# Patient Record
Sex: FEMALE | Race: WHITE | NOT HISPANIC OR LATINO | Employment: FULL TIME | URBAN - METROPOLITAN AREA
[De-identification: names, ages, dates, MRNs, and addresses within clinical notes are randomized per-mention and may not be internally consistent; named-entity substitution may affect disease eponyms.]

---

## 2017-01-04 ENCOUNTER — TELEPHONE (OUTPATIENT)
Dept: OBSTETRICS AND GYNECOLOGY | Age: 27
End: 2017-01-04

## 2017-01-04 NOTE — TELEPHONE ENCOUNTER
----- Message from Kindra Miles sent at 1/4/2017 11:48 AM EST -----  Dr Sheriff pt, requesting a 2nd opinion at [facility], but is needing a referral from Dr Sheriff first. [phone] pls call pt at [phone]

## 2017-01-04 NOTE — TELEPHONE ENCOUNTER
Called pt but number would not go through from office as given, reviewed allscripts records, unsure of reason for referral, last paps were normal, advise pt to schedule visit to discuss further

## 2017-01-10 ENCOUNTER — TELEPHONE (OUTPATIENT)
Dept: OBSTETRICS AND GYNECOLOGY | Age: 27
End: 2017-01-10

## 2017-01-10 NOTE — TELEPHONE ENCOUNTER
----- Message from Khushbu Vasquez sent at 1/10/2017  2:44 PM EST -----  ----- Message from Kindra Miles sent at 1/4/2017 11:48 AM EST -----  Dr Sheriff pt, requesting a 2nd opinion at [facility], but is needing a referral from Dr Sheriff first. [phone]       Pt is wanting a 2nd opinion about an issue she has had for 5 years. Please call pt- [phone] corrected number.

## 2017-01-10 NOTE — TELEPHONE ENCOUNTER
Called pt, she called and requested she be seen by oncologist due to abnormal paps. Patient has had 2 negative pap smears and is overdue for annual/repeat pap smear. Reviewed allscripts records, per records, np reviewed last pap and left message for staff to notify pt that pap was normal. Patient was advised of prior pap and colpo results that were negative by MD. Advised pt of prior normal results so no reason to refer to oncology at this time. She is overdue for annual so if current results are abnormal or if there is another issue, can refer  as indicated. Explained to pt that it would not be appropriate for me to make a referral without a current indication.    Patient reports that she was told her last pap was abnormal, but explained that the pap was normal and the reviewer advised that pt be given negative results. Again reviewed results of most recent pathology with pt. She reports that she was told it was abnormal and wants a second opinion. Recommended she schedule annual visit but she can seek second opinion as she desires.

## 2017-01-10 NOTE — TELEPHONE ENCOUNTER
Offered pt an appt 1-30-17 at 2:00, she does not get off work til 2, offered next avail 2-13-17 pt did not want to wait that long and stated she will go to another dr robles.

## 2017-01-10 NOTE — TELEPHONE ENCOUNTER
Please have pt schedule visit, unsure of specific issue, she was previously treated by other MD, will need to review before I can schedule consult

## 2017-12-08 ENCOUNTER — OFFICE VISIT (OUTPATIENT)
Dept: RETAIL CLINIC | Facility: CLINIC | Age: 27
End: 2017-12-08

## 2017-12-08 VITALS
RESPIRATION RATE: 16 BRPM | SYSTOLIC BLOOD PRESSURE: 112 MMHG | HEART RATE: 78 BPM | DIASTOLIC BLOOD PRESSURE: 80 MMHG | OXYGEN SATURATION: 99 % | TEMPERATURE: 97.2 F

## 2017-12-08 DIAGNOSIS — H65.03 BILATERAL ACUTE SEROUS OTITIS MEDIA, RECURRENCE NOT SPECIFIED: Primary | ICD-10-CM

## 2017-12-08 DIAGNOSIS — J06.9 VIRAL UPPER RESPIRATORY INFECTION: ICD-10-CM

## 2017-12-08 DIAGNOSIS — A08.4 VIRAL GASTROENTERITIS: ICD-10-CM

## 2017-12-08 PROBLEM — R11.0 NAUSEA: Status: ACTIVE | Noted: 2017-12-08

## 2017-12-08 PROBLEM — H93.8X3 EAR FULLNESS, BILATERAL: Status: ACTIVE | Noted: 2017-12-08

## 2017-12-08 PROBLEM — R19.7 DIARRHEA: Status: ACTIVE | Noted: 2017-12-08

## 2017-12-08 PROCEDURE — 99213 OFFICE O/P EST LOW 20 MIN: CPT | Performed by: NURSE PRACTITIONER

## 2017-12-08 RX ORDER — DOXEPIN HYDROCHLORIDE 50 MG/1
50 CAPSULE ORAL NIGHTLY
COMMUNITY
End: 2018-08-09

## 2017-12-08 RX ORDER — ONDANSETRON 4 MG/1
4 TABLET, FILM COATED ORAL EVERY 8 HOURS PRN
Qty: 9 TABLET | Refills: 0 | Status: SHIPPED | OUTPATIENT
Start: 2017-12-08 | End: 2018-08-09

## 2017-12-08 RX ORDER — TOPIRAMATE 100 MG/1
CAPSULE, EXTENDED RELEASE ORAL
COMMUNITY
End: 2018-08-09

## 2017-12-08 RX ORDER — PREDNISONE 1 MG/1
TABLET ORAL
Qty: 21 TABLET | Refills: 0 | Status: SHIPPED | OUTPATIENT
Start: 2017-12-08 | End: 2018-08-09

## 2017-12-08 NOTE — PROGRESS NOTES
Subjective   Carolina Frias is a 27 y.o. female.     URI    This is a new problem. The current episode started yesterday. There has been no fever. Associated symptoms include congestion, coughing, headaches, nausea and a plugged ear sensation. She has tried acetaminophen for the symptoms. The treatment provided mild relief.   Nausea   This is a new problem. The current episode started yesterday. The problem has been waxing and waning. Associated symptoms include chills, congestion, coughing, headaches and nausea. Nothing aggravates the symptoms. She has tried acetaminophen for the symptoms. The treatment provided mild relief.   Ear Fullness    There is pain in both ears. This is a recurrent problem. The current episode started in the past 7 days. There has been no fever. Associated symptoms include coughing and headaches. Pertinent negatives include no hearing loss. She has tried acetaminophen for the symptoms. The treatment provided mild relief. There is no history of a chronic ear infection.        The following portions of the patient's history were reviewed and updated as appropriate: allergies, current medications, past family history, past medical history, past social history, past surgical history and problem list.    Review of Systems   Constitutional: Positive for chills.   HENT: Positive for congestion and voice change. Negative for hearing loss and trouble swallowing.         Both ear fullness   Eyes: Negative.    Respiratory: Positive for cough. Negative for chest tightness.    Cardiovascular: Negative.    Gastrointestinal: Positive for nausea.   Neurological: Positive for headaches.       Objective   Physical Exam   Constitutional: She appears well-developed.   HENT:   Head: Normocephalic and atraumatic.   Right Ear: External ear normal. Tympanic membrane mobility is abnormal. A middle ear effusion is present.   Left Ear: External ear normal. Tympanic membrane mobility is abnormal. A middle ear  effusion is present.   Nose: Mucosal edema present. Right sinus exhibits no maxillary sinus tenderness and no frontal sinus tenderness. Left sinus exhibits no maxillary sinus tenderness and no frontal sinus tenderness.   Mouth/Throat: No oropharyngeal exudate.   Eyes: Pupils are equal, round, and reactive to light.   Neck: Normal range of motion.   Cardiovascular: Normal rate and regular rhythm.    Pulmonary/Chest: Effort normal and breath sounds normal. She has no decreased breath sounds. She has no wheezes. She has no rhonchi. She has no rales.   Abdominal: Soft. Bowel sounds are normal. There is generalized tenderness and tenderness in the epigastric area.   Nursing note and vitals reviewed.      Assessment/Plan   Carolina was seen today for uri, nausea, diarrhea, cough and nasal congestion.    Diagnoses and all orders for this visit:    Bilateral acute serous otitis media, recurrence not specified  -     predniSONE (DELTASONE) 5 MG tablet; 5 mg pack with packet instructions    Viral gastroenteritis  -     ondansetron (ZOFRAN) 4 MG tablet; Take 1 tablet by mouth Every 8 (Eight) Hours As Needed for Nausea or Vomiting.    Viral upper respiratory infection  -     Chlorcyclizine-Pseudoephed (STAHIST AD) 25-60 MG tablet; Take 1 tablet by mouth 2 (Two) Times a Day for 7 days.    Talked to the patient about the diagnosis and educate the patient and advise to visit to PCP if the symptoms worsens

## 2017-12-08 NOTE — PATIENT INSTRUCTIONS
Serous Otitis Media  Serous otitis media is fluid in the middle ear space. This space contains the bones for hearing and air. Air in the middle ear space helps to transmit sound.   The air gets there through the eustachian tube. This tube goes from the back of the nose (nasopharynx) to the middle ear space. It keeps the pressure in the middle ear the same as the outside world. It also helps to drain fluid from the middle ear space.  CAUSES   Serous otitis media occurs when the eustachian tube gets blocked. Blockage can come from:  · Ear infections.  · Colds and other upper respiratory infections.  · Allergies.  · Irritants such as cigarette smoke.  · Sudden changes in air pressure (such as descending in an airplane).  · Enlarged adenoids.  · A mass in the nasopharynx.  During colds and upper respiratory infections, the middle ear space can become temporarily filled with fluid. This can happen after an ear infection also. Once the infection clears, the fluid will generally drain out of the ear through the eustachian tube. If it does not, then serous otitis media occurs.  SIGNS AND SYMPTOMS   · Hearing loss.  · A feeling of fullness in the ear, without pain.  · Young children may not show any symptoms but may show slight behavioral changes, such as agitation, ear pulling, or crying.  DIAGNOSIS   Serous otitis media is diagnosed by an ear exam. Tests may be done to check on the movement of the eardrum. Hearing exams may also be done.  TREATMENT   The fluid most often goes away without treatment. If allergy is the cause, allergy treatment may be helpful. Fluid that persists for several months may require minor surgery. A small tube is placed in the eardrum to:  · Drain the fluid.  · Restore the air in the middle ear space.  In certain situations, antibiotic medicines are used to avoid surgery. Surgery may be done to remove enlarged adenoids (if this is the cause).  HOME CARE INSTRUCTIONS   · Keep children away from  tobacco smoke.  · Keep all follow-up visits as directed by your health care provider.  SEEK MEDICAL CARE IF:   · Your hearing is not better in 3 months.  · Your hearing is worse.  · You have ear pain.  · You have drainage from the ear.  · You have dizziness.  · You have serous otitis media only in one ear or have any bleeding from your nose (epistaxis).  · You notice a lump on your neck.  MAKE SURE YOU:  · Understand these instructions.    · Will watch your condition.    · Will get help right away if you are not doing well or get worse.       This information is not intended to replace advice given to you by your health care provider. Make sure you discuss any questions you have with your health care provider.     Document Released: 03/09/2005 Document Revised: 01/08/2016 Document Reviewed: 07/15/2014  [employer] Interactive Patient Education ©2017 [employer]    Viral Gastroenteritis, Adult  Viral gastroenteritis is also known as the stomach flu. This condition is caused by various viruses. These viruses can be passed from person to person very easily (are very contagious). This condition may affect your stomach, small intestine, and large intestine. It can cause sudden watery diarrhea, fever, and vomiting.  Diarrhea and vomiting can make you feel weak and cause you to become dehydrated. You may not be able to keep fluids down. Dehydration can make you tired and thirsty, cause you to have a dry mouth, and decrease how often you urinate. Older adults and people with other diseases or a weak immune system are at higher risk for dehydration.  It is important to replace the fluids that you lose from diarrhea and vomiting. If you become severely dehydrated, you may need to get fluids through an IV tube.  CAUSES  Gastroenteritis is caused by various viruses, including rotavirus and norovirus. Norovirus is the most common cause in adults.  You can get sick by eating food, drinking water, or touching a surface contaminated  with one of these viruses. You can also get sick from sharing utensils or other personal items with an infected person.  RISK FACTORS  This condition is more likely to develop in people:  · Who have a weak defense system (immune system).  · Who live with one or more children who are younger than 2 years old.  · Who live in a nursing home.  · Who go on cruise ships.  SYMPTOMS  Symptoms of this condition start suddenly 1-2 days after exposure to a virus. Symptoms may last a few days or as long as a week. The most common symptoms are watery diarrhea and vomiting. Other symptoms include:  · Fever.  · Headache.  · Fatigue.  · Pain in the abdomen.  · Chills.  · Weakness.  · Nausea.  · Muscle aches.  · Loss of appetite.  DIAGNOSIS  This condition is diagnosed with a medical history and physical exam. You may also have a stool test to check for viruses or other infections.  TREATMENT  This condition typically goes away on its own. The focus of treatment is to restore lost fluids (rehydration). Your health care provider may recommend that you take an oral rehydration solution (ORS) to replace important salts and minerals (electrolytes) in your body. Severe cases of this condition may require giving fluids through an IV tube.  Treatment may also include medicine to help with your symptoms.  HOME CARE INSTRUCTIONS  Follow instructions from your health care provider about how to care for yourself at home.  Eating and Drinking  Follow these recommendations as told by your health care provider:  · Take an ORS. This is a drink that is sold at pharmacies and retail stores.  · Drink clear fluids in small amounts as you are able. Clear fluids include water, ice chips, diluted fruit juice, and low-calorie sports drinks.  · Eat bland, easy-to-digest foods in small amounts as you are able. These foods include bananas, applesauce, rice, lean meats, toast, and crackers.  · Avoid fluids that contain a lot of sugar or caffeine, such as  energy drinks, sports drinks, and soda.  · Avoid alcohol.  · Avoid spicy or fatty foods.  General Instructions  · Drink enough fluid to keep your urine clear or pale yellow.  · Wash your hands often. If soap and water are not available, use hand .  · Make sure that all people in your household wash their hands well and often.  · Take over-the-counter and prescription medicines only as told by your health care provider.  · Rest at home while you recover.  · Watch your condition for any changes.  · Take a warm bath to relieve any burning or pain from frequent diarrhea episodes.  · Keep all follow-up visits as told by your health care provider. This is important.  SEEK MEDICAL CARE IF:  · You cannot keep fluids down.  · Your symptoms get worse.  · You have new symptoms.  · You feel light-headed or dizzy.  · You have muscle cramps.  SEEK IMMEDIATE MEDICAL CARE IF:  · You have chest pain.  · You feel extremely weak or you faint.  · You see blood in your vomit.  · Your vomit looks like coffee grounds.  · You have bloody or black stools or stools that look like tar.  · You have a severe headache, a stiff neck, or both.  · You have a rash.  · You have severe pain, cramping, or bloating in your abdomen.  · You have trouble breathing or you are breathing very quickly.  · Your heart is beating very quickly.  · Your skin feels cold and clammy.  · You feel confused.  · You have pain when you urinate.  · You have signs of dehydration, such as:    Dark urine, very little urine, or no urine.    Cracked lips.    Dry mouth.    Sunken eyes.    Sleepiness.    Weakness.     This information is not intended to replace advice given to you by your health care provider. Make sure you discuss any questions you have with your health care provider.     Document Released: 12/18/2006 Document Revised: 04/10/2017 Document Reviewed: 08/23/2016  [employer] Interactive Patient Education ©2017 [employer]    Upper Respiratory Infection,  "Adult  Most upper respiratory infections (URIs) are a viral infection of the air passages leading to the lungs. A URI affects the nose, throat, and upper air passages. The most common type of URI is nasopharyngitis and is typically referred to as \"the common cold.\"  URIs run their course and usually go away on their own. Most of the time, a URI does not require medical attention, but sometimes a bacterial infection in the upper airways can follow a viral infection. This is called a secondary infection. Sinus and middle ear infections are common types of secondary upper respiratory infections.  Bacterial pneumonia can also complicate a URI. A URI can worsen asthma and chronic obstructive pulmonary disease (COPD). Sometimes, these complications can require emergency medical care and may be life threatening.   CAUSES  Almost all URIs are caused by viruses. A virus is a type of germ and can spread from one person to another.   RISKS FACTORS  You may be at risk for a URI if:   · You smoke.    · You have chronic heart or lung disease.  · You have a weakened defense (immune) system.    · You are very young or very old.    · You have nasal allergies or asthma.  · You work in crowded or poorly ventilated areas.  · You work in health care facilities or schools.  SIGNS AND SYMPTOMS   Symptoms typically develop 2-3 days after you come in contact with a cold virus. Most viral URIs last 7-10 days. However, viral URIs from the influenza virus (flu virus) can last 14-18 days and are typically more severe. Symptoms may include:   · Runny or stuffy (congested) nose.    · Sneezing.    · Cough.    · Sore throat.    · Headache.    · Fatigue.    · Fever.    · Loss of appetite.    · Pain in your forehead, behind your eyes, and over your cheekbones (sinus pain).  · Muscle aches.    DIAGNOSIS   Your health care provider may diagnose a URI by:  · Physical exam.  · Tests to check that your symptoms are not due to another condition such " as:  ¨ Strep throat.  ¨ Sinusitis.  ¨ Pneumonia.  ¨ Asthma.  TREATMENT   A URI goes away on its own with time. It cannot be cured with medicines, but medicines may be prescribed or recommended to relieve symptoms. Medicines may help:  · Reduce your fever.  · Reduce your cough.  · Relieve nasal congestion.  HOME CARE INSTRUCTIONS   · Take medicines only as directed by your health care provider.    · Gargle warm saltwater or take cough drops to comfort your throat as directed by your health care provider.  · Use a warm mist humidifier or inhale steam from a shower to increase air moisture. This may make it easier to breathe.  · Drink enough fluid to keep your urine clear or pale yellow.    · Eat soups and other clear broths and maintain good nutrition.    · Rest as needed.    · Return to work when your temperature has returned to normal or as your health care provider advises. You may need to stay home longer to avoid infecting others. You can also use a face mask and careful hand washing to prevent spread of the virus.  · Increase the usage of your inhaler if you have asthma.    · Do not use any tobacco products, including cigarettes, chewing tobacco, or electronic cigarettes. If you need help quitting, ask your health care provider.  PREVENTION   The best way to protect yourself from getting a cold is to practice good hygiene.   · Avoid oral or hand contact with people with cold symptoms.    · Wash your hands often if contact occurs.    There is no clear evidence that vitamin C, vitamin E, echinacea, or exercise reduces the chance of developing a cold. However, it is always recommended to get plenty of rest, exercise, and practice good nutrition.   SEEK MEDICAL CARE IF:   · You are getting worse rather than better.    · Your symptoms are not controlled by medicine.    · You have chills.  · You have worsening shortness of breath.  · You have brown or red mucus.  · You have yellow or brown nasal discharge.  · You have  pain in your face, especially when you bend forward.  · You have a fever.  · You have swollen neck glands.  · You have pain while swallowing.  · You have white areas in the back of your throat.  SEEK IMMEDIATE MEDICAL CARE IF:   · You have severe or persistent:    Headache.    Ear pain.    Sinus pain.    Chest pain.  · You have chronic lung disease and any of the following:    Wheezing.    Prolonged cough.    Coughing up blood.    A change in your usual mucus.  · You have a stiff neck.  · You have changes in your:    Vision.    Hearing.    Thinking.    Mood.  MAKE SURE YOU:   · Understand these instructions.  · Will watch your condition.  · Will get help right away if you are not doing well or get worse.     This information is not intended to replace advice given to you by your health care provider. Make sure you discuss any questions you have with your health care provider.     Document Released: 06/13/2002 Document Revised: 05/03/2016 Document Reviewed: 03/25/2015  [employer] Interactive Patient Education ©2017 [employer]  Talked to the patient about the diagnosis and educate the patient and advise to visit to PCP if the symptoms worsens

## 2018-02-02 ENCOUNTER — TRANSCRIBE ORDERS (OUTPATIENT)
Dept: ADMINISTRATIVE | Facility: HOSPITAL | Age: 28
End: 2018-02-02

## 2018-02-02 ENCOUNTER — HOSPITAL ENCOUNTER (OUTPATIENT)
Dept: CARDIOLOGY | Facility: HOSPITAL | Age: 28
Discharge: HOME | End: 2018-02-02
Admitting: FAMILY MEDICINE

## 2018-02-02 DIAGNOSIS — R00.2 PALPITATIONS: ICD-10-CM

## 2018-02-02 DIAGNOSIS — R00.2 PALPITATIONS: Primary | ICD-10-CM

## 2018-02-02 PROCEDURE — 93225 XTRNL ECG REC<48 HRS REC: CPT

## 2018-02-02 PROCEDURE — 93226 XTRNL ECG REC<48 HR SCAN A/R: CPT

## 2018-02-05 PROCEDURE — 93227 XTRNL ECG REC<48 HR R&I: CPT | Performed by: INTERNAL MEDICINE

## 2018-08-02 NOTE — TELEPHONE ENCOUNTER
Attempted to call, number will not go through as given   Needs significant assistance at home; will need to look into further resources (see below)

## 2018-08-09 ENCOUNTER — HOSPITAL ENCOUNTER (EMERGENCY)
Facility: HOSPITAL | Age: 28
Discharge: HOME | End: 2018-08-09
Attending: EMERGENCY MEDICINE | Admitting: EMERGENCY MEDICINE

## 2018-08-09 ENCOUNTER — APPOINTMENT (OUTPATIENT)
Dept: CT IMAGING | Facility: HOSPITAL | Age: 28
End: 2018-08-09

## 2018-08-09 VITALS
BODY MASS INDEX: 45.82 KG/M2 | SYSTOLIC BLOOD PRESSURE: 124 MMHG | HEIGHT: 62 IN | RESPIRATION RATE: 18 BRPM | HEART RATE: 80 BPM | DIASTOLIC BLOOD PRESSURE: 79 MMHG | TEMPERATURE: 98.4 F | OXYGEN SATURATION: 97 % | WEIGHT: 249 LBS

## 2018-08-09 DIAGNOSIS — R51.9 ACUTE NONINTRACTABLE HEADACHE, UNSPECIFIED HEADACHE TYPE: Primary | ICD-10-CM

## 2018-08-09 LAB
ALBUMIN SERPL-MCNC: 4.2 G/DL (ref 3.5–5.2)
ALBUMIN/GLOB SERPL: 1.5 G/DL
ALP SERPL-CCNC: 67 U/L (ref 40–129)
ALT SERPL W P-5'-P-CCNC: 12 U/L (ref 5–33)
ANION GAP SERPL CALCULATED.3IONS-SCNC: 14 MMOL/L
AST SERPL-CCNC: 14 U/L (ref 5–32)
BASOPHILS # BLD AUTO: 0.03 10*3/MM3 (ref 0–0.2)
BASOPHILS NFR BLD AUTO: 0.5 % (ref 0–2)
BILIRUB SERPL-MCNC: 0.3 MG/DL (ref 0.2–1.2)
BUN BLD-MCNC: 12 MG/DL (ref 6–20)
BUN/CREAT SERPL: 21.8 (ref 7–25)
CALCIUM SPEC-SCNC: 8.8 MG/DL (ref 8.6–10.5)
CHLORIDE SERPL-SCNC: 101 MMOL/L (ref 98–107)
CO2 SERPL-SCNC: 24 MMOL/L (ref 22–29)
CREAT BLD-MCNC: 0.55 MG/DL (ref 0.57–1)
DEPRECATED RDW RBC AUTO: 35.8 FL (ref 37–54)
EOSINOPHIL # BLD AUTO: 0.06 10*3/MM3 (ref 0.1–0.3)
EOSINOPHIL NFR BLD AUTO: 1 % (ref 0–4)
ERYTHROCYTE [DISTWIDTH] IN BLOOD BY AUTOMATED COUNT: 11.8 % (ref 11.5–14.5)
GFR SERPL CREATININE-BSD FRML MDRD: 133 ML/MIN/1.73
GLOBULIN UR ELPH-MCNC: 2.8 GM/DL
GLUCOSE BLD-MCNC: 103 MG/DL (ref 65–99)
HCG SERPL QL: NEGATIVE
HCT VFR BLD AUTO: 40.7 % (ref 37–47)
HGB BLD-MCNC: 13.5 G/DL (ref 12–16)
IMM GRANULOCYTES # BLD: 0.01 10*3/MM3 (ref 0–0.03)
IMM GRANULOCYTES NFR BLD: 0.2 % (ref 0–0.5)
LYMPHOCYTES # BLD AUTO: 1.81 10*3/MM3 (ref 0.6–4.8)
LYMPHOCYTES NFR BLD AUTO: 28.8 % (ref 20–45)
MCH RBC QN AUTO: 27.8 PG (ref 27–31)
MCHC RBC AUTO-ENTMCNC: 33.2 G/DL (ref 31–37)
MCV RBC AUTO: 83.7 FL (ref 81–99)
MONOCYTES # BLD AUTO: 0.25 10*3/MM3 (ref 0–1)
MONOCYTES NFR BLD AUTO: 4 % (ref 3–8)
NEUTROPHILS # BLD AUTO: 4.13 10*3/MM3 (ref 1.5–8.3)
NEUTROPHILS NFR BLD AUTO: 65.5 % (ref 45–70)
NRBC BLD MANUAL-RTO: 0 /100 WBC (ref 0–0)
PLATELET # BLD AUTO: 309 10*3/MM3 (ref 140–500)
PMV BLD AUTO: 8.4 FL (ref 7.4–10.4)
POTASSIUM BLD-SCNC: 4 MMOL/L (ref 3.5–5.2)
PROT SERPL-MCNC: 7 G/DL (ref 6–8.5)
RBC # BLD AUTO: 4.86 10*6/MM3 (ref 4.2–5.4)
SODIUM BLD-SCNC: 139 MMOL/L (ref 136–145)
WBC NRBC COR # BLD: 6.29 10*3/MM3 (ref 4.8–10.8)

## 2018-08-09 PROCEDURE — 96361 HYDRATE IV INFUSION ADD-ON: CPT

## 2018-08-09 PROCEDURE — 84703 CHORIONIC GONADOTROPIN ASSAY: CPT | Performed by: EMERGENCY MEDICINE

## 2018-08-09 PROCEDURE — 70450 CT HEAD/BRAIN W/O DYE: CPT

## 2018-08-09 PROCEDURE — 99284 EMERGENCY DEPT VISIT MOD MDM: CPT | Performed by: EMERGENCY MEDICINE

## 2018-08-09 PROCEDURE — 25010000002 DIPHENHYDRAMINE PER 50 MG: Performed by: EMERGENCY MEDICINE

## 2018-08-09 PROCEDURE — 25010000002 PROCHLORPERAZINE EDISYLATE PER 10 MG: Performed by: EMERGENCY MEDICINE

## 2018-08-09 PROCEDURE — 25010000002 KETOROLAC TROMETHAMINE PER 15 MG: Performed by: EMERGENCY MEDICINE

## 2018-08-09 PROCEDURE — 85025 COMPLETE CBC W/AUTO DIFF WBC: CPT | Performed by: EMERGENCY MEDICINE

## 2018-08-09 PROCEDURE — 99284 EMERGENCY DEPT VISIT MOD MDM: CPT

## 2018-08-09 PROCEDURE — 96375 TX/PRO/DX INJ NEW DRUG ADDON: CPT

## 2018-08-09 PROCEDURE — 96374 THER/PROPH/DIAG INJ IV PUSH: CPT

## 2018-08-09 PROCEDURE — 80053 COMPREHEN METABOLIC PANEL: CPT | Performed by: EMERGENCY MEDICINE

## 2018-08-09 RX ORDER — KETOROLAC TROMETHAMINE 30 MG/ML
30 INJECTION, SOLUTION INTRAMUSCULAR; INTRAVENOUS ONCE
Status: COMPLETED | OUTPATIENT
Start: 2018-08-09 | End: 2018-08-09

## 2018-08-09 RX ORDER — DIPHENHYDRAMINE HYDROCHLORIDE 50 MG/ML
25 INJECTION INTRAMUSCULAR; INTRAVENOUS ONCE
Status: COMPLETED | OUTPATIENT
Start: 2018-08-09 | End: 2018-08-09

## 2018-08-09 RX ORDER — SODIUM CHLORIDE 0.9 % (FLUSH) 0.9 %
10 SYRINGE (ML) INJECTION AS NEEDED
Status: DISCONTINUED | OUTPATIENT
Start: 2018-08-09 | End: 2018-08-09 | Stop reason: HOSPADM

## 2018-08-09 RX ORDER — SUMATRIPTAN 50 MG/1
50 TABLET, FILM COATED ORAL
Qty: 9 TABLET | Refills: 0 | Status: SHIPPED | OUTPATIENT
Start: 2018-08-09

## 2018-08-09 RX ADMIN — KETOROLAC TROMETHAMINE 30 MG: 30 INJECTION, SOLUTION INTRAMUSCULAR at 08:46

## 2018-08-09 RX ADMIN — PROCHLORPERAZINE EDISYLATE 10 MG: 5 INJECTION INTRAMUSCULAR; INTRAVENOUS at 08:40

## 2018-08-09 RX ADMIN — DIPHENHYDRAMINE HYDROCHLORIDE 25 MG: 50 INJECTION, SOLUTION INTRAMUSCULAR; INTRAVENOUS at 08:45

## 2018-08-09 RX ADMIN — SODIUM CHLORIDE 1000 ML: 9 INJECTION, SOLUTION INTRAVENOUS at 08:43

## 2018-08-09 NOTE — ED PROVIDER NOTES
Subjective   History of Present Illness  History of Present Illness    Chief complaint: Headache    Location: Left side of head and face    Quality/Severity:  Moderate to severe sharp pain    Timing/Duration: Began around 6 AM this morning    Modifying Factors: Worse with light and noise stimulation    Narrative: This patient presents for evaluation of a headache problem.  She has a history of migraines in the past.  She says ordinarily she is able to take one of her prescription medications and lay down for a while then the headache will go away.  This morning, she woke up at 6 AM with a severe headache in the left side of her head that radiated into the left face and caused some facial numbness on the left side near her jaw.  It also does cause some nausea and vomiting.  She says it is worse with light and noise stimulation.  She took one of her sumatriptan tablets this morning hoping that that would alleviate the headache but it has not so far.  The patient denies any recent fevers or illnesses.  She denies any pains elsewhere to the rest of her body.  She says that she has never had a headache so bad that she had to go to the hospital before.    Associated Symptoms: As above    Review of Systems   Constitutional: Negative for activity change and fever.   HENT: Negative for congestion, dental problem, ear discharge, facial swelling, sore throat and voice change.    Eyes: Positive for photophobia and visual disturbance (blurry vision). Negative for pain.   Respiratory: Negative for shortness of breath.    Cardiovascular: Negative for chest pain.   Gastrointestinal: Positive for nausea and vomiting. Negative for abdominal pain.   Skin: Negative for color change and rash.   Neurological: Positive for numbness and headaches. Negative for syncope.   All other systems reviewed and are negative.      Past Medical History:   Diagnosis Date   • ASCUS of cervix with negative high risk HPV 07/28/2014   • Atypical squamous  cells of undetermined significance (ASCUS) on Papanicolaou smear of cervix 2012    NO HPV DONE   • Bipolar depression (CMS/HCC)    • Cervical high risk HPV (human papillomavirus) test positive 2015    HPV 16   • Kidney stones    • LGSIL on Pap smear of cervix 2011   • Migraine    • SAB (spontaneous )    • SAB (spontaneous )        Allergies   Allergen Reactions   • Lamictal [Lamotrigine]        Past Surgical History:   Procedure Laterality Date   •  SECTION      x 2    • COLPOSCOPY  2011    NO PATH SENT   • COLPOSCOPY W/ BIOPSY / CURETTAGE  2014    LGSIL   • COLPOSCOPY W/ BIOPSY / CURETTAGE  2012    CHRONIC CERVICITIS AND SQUAMOUS METAPLASIA   • COLPOSCOPY W/ BIOPSY / CURETTAGE  2015    NEGATIVE   • TONSILLECTOMY         Family History   Problem Relation Age of Onset   • Atrial fibrillation Father        Social History     Social History   • Marital status: Single     Social History Main Topics   • Smoking status: Former Smoker     Packs/day: 0.50     Years: 10.00     Types: Electronic Cigarette   • Smokeless tobacco: Never Used   • Alcohol use No      Comment: rare   • Drug use: Yes     Types: Marijuana      Comment: cbd oil   • Sexual activity: Yes     Birth control/ protection: Implant      Comment: NEXPLANON     Other Topics Concern   • Not on file     ED Triage Vitals [18 0814]   Temp Heart Rate Resp BP SpO2   98.6 °F (37 °C) 90 18 133/92 97 %      Temp src Heart Rate Source Patient Position BP Location FiO2 (%)   -- -- -- -- --           Objective   Physical Exam   Constitutional: She is oriented to person, place, and time. She appears well-developed and well-nourished. No distress.   HENT:   Head: Normocephalic and atraumatic.   Eyes: Pupils are equal, round, and reactive to light. EOM are normal. Right eye exhibits no discharge. Left eye exhibits no discharge.   Photophobia apparent   Neck: Normal range of motion. Neck supple.    Cardiovascular: Normal rate, regular rhythm, normal heart sounds and intact distal pulses.  Exam reveals no gallop and no friction rub.    No murmur heard.  Pulmonary/Chest: Effort normal. No respiratory distress. She has no wheezes. She has no rales. She exhibits no tenderness.   Abdominal: Soft. She exhibits no distension. There is no tenderness.   Musculoskeletal: Normal range of motion. She exhibits no edema or deformity.   Neurological: She is alert and oriented to person, place, and time. She exhibits normal muscle tone.   Skin: Skin is warm and dry. No rash noted. She is not diaphoretic. No erythema. No pallor.   Psychiatric: She has a normal mood and affect. Her behavior is normal. Judgment and thought content normal.   Nursing note and vitals reviewed.    Results for orders placed or performed during the hospital encounter of 08/09/18   Comprehensive Metabolic Panel   Result Value Ref Range    Glucose 103 (H) 65 - 99 mg/dL    BUN 12 6 - 20 mg/dL    Creatinine 0.55 (L) 0.57 - 1.00 mg/dL    Sodium 139 136 - 145 mmol/L    Potassium 4.0 3.5 - 5.2 mmol/L    Chloride 101 98 - 107 mmol/L    CO2 24.0 22.0 - 29.0 mmol/L    Calcium 8.8 8.6 - 10.5 mg/dL    Total Protein 7.0 6.0 - 8.5 g/dL    Albumin 4.20 3.50 - 5.20 g/dL    ALT (SGPT) 12 5 - 33 U/L    AST (SGOT) 14 5 - 32 U/L    Alkaline Phosphatase 67 40 - 129 U/L    Total Bilirubin 0.3 0.2 - 1.2 mg/dL    eGFR Non African Amer 133 >60 mL/min/1.73    Globulin 2.8 gm/dL    A/G Ratio 1.5 g/dL    BUN/Creatinine Ratio 21.8 7.0 - 25.0    Anion Gap 14.0 mmol/L   hCG, Serum, Qualitative   Result Value Ref Range    HCG Qualitative Negative Negative   CBC Auto Differential   Result Value Ref Range    WBC 6.29 4.80 - 10.80 10*3/mm3    RBC 4.86 4.20 - 5.40 10*6/mm3    Hemoglobin 13.5 12.0 - 16.0 g/dL    Hematocrit 40.7 37.0 - 47.0 %    MCV 83.7 81.0 - 99.0 fL    MCH 27.8 27.0 - 31.0 pg    MCHC 33.2 31.0 - 37.0 g/dL    RDW 11.8 11.5 - 14.5 %    RDW-SD 35.8 (L) 37.0 - 54.0 fl     "MPV 8.4 7.4 - 10.4 fL    Platelets 309 140 - 500 10*3/mm3    Neutrophil % 65.5 45.0 - 70.0 %    Lymphocyte % 28.8 20.0 - 45.0 %    Monocyte % 4.0 3.0 - 8.0 %    Eosinophil % 1.0 0.0 - 4.0 %    Basophil % 0.5 0.0 - 2.0 %    Immature Grans % 0.2 0.0 - 0.5 %    Neutrophils, Absolute 4.13 1.50 - 8.30 10*3/mm3    Lymphocytes, Absolute 1.81 0.60 - 4.80 10*3/mm3    Monocytes, Absolute 0.25 0.00 - 1.00 10*3/mm3    Eosinophils, Absolute 0.06 (L) 0.10 - 0.30 10*3/mm3    Basophils, Absolute 0.03 0.00 - 0.20 10*3/mm3    Immature Grans, Absolute 0.01 0.00 - 0.03 10*3/mm3    nRBC 0.0 0.0 - 0.0 /100 WBC         RADIOLOGY        Study: CT head without contrast    Findings: normal    Interpreted contemporaneously with treatment by DR. Abebe, independently viewed by me          Procedures           ED Course  ED Course as of Aug 09 1014   Thu Aug 09, 2018   1005 I have reviewed the labs and the head CT from today's visit.  Everything appears normal and reassuring here.  The patient is feeling better after some IV Compazine and Benadryl and Toradol.  I think she can discharge home safely at this time.  We'll give her the usual \"return to ER\" instructions for any worsening signs or symptoms regarding her headache.  Advised follow-up with her primary care doctor also.  [BUBBA]      ED Course User Index  [BUBBA] Bridger Rivera MD                  MDM  Number of Diagnoses or Management Options     Amount and/or Complexity of Data Reviewed  Clinical lab tests: reviewed and ordered  Tests in the radiology section of CPT®: ordered and reviewed  Decide to obtain previous medical records or to obtain history from someone other than the patient: yes  Review and summarize past medical records: yes  Independent visualization of images, tracings, or specimens: yes    Risk of Complications, Morbidity, and/or Mortality  Presenting problems: moderate  Diagnostic procedures: moderate  Management options: moderate          Final diagnoses:   Acute " nonintractable headache, unspecified headache type            Bridger Rivera MD  08/09/18 3872

## 2018-08-09 NOTE — DISCHARGE INSTRUCTIONS
Please return to the emergency room for any worsening pain, fevers, vision changes, nausea, vomiting, weakness or any other concerns.

## 2018-10-14 ENCOUNTER — APPOINTMENT (OUTPATIENT)
Dept: CT IMAGING | Facility: HOSPITAL | Age: 28
End: 2018-10-14

## 2018-10-14 ENCOUNTER — HOSPITAL ENCOUNTER (EMERGENCY)
Facility: HOSPITAL | Age: 28
Discharge: HOME | End: 2018-10-14
Attending: EMERGENCY MEDICINE | Admitting: EMERGENCY MEDICINE

## 2018-10-14 VITALS
RESPIRATION RATE: 18 BRPM | BODY MASS INDEX: 44.16 KG/M2 | HEART RATE: 76 BPM | WEIGHT: 240 LBS | SYSTOLIC BLOOD PRESSURE: 150 MMHG | HEIGHT: 62 IN | OXYGEN SATURATION: 99 % | TEMPERATURE: 98.2 F | DIASTOLIC BLOOD PRESSURE: 110 MMHG

## 2018-10-14 DIAGNOSIS — N20.1 LEFT URETERAL CALCULUS: Primary | ICD-10-CM

## 2018-10-14 LAB
B-HCG UR QL: NEGATIVE
BACTERIA UR QL AUTO: ABNORMAL /HPF
BILIRUB UR QL STRIP: NEGATIVE
CLARITY UR: CLEAR
COLOR UR: YELLOW
GLUCOSE UR STRIP-MCNC: NEGATIVE MG/DL
HGB UR QL STRIP.AUTO: ABNORMAL
HYALINE CASTS UR QL AUTO: ABNORMAL /LPF
KETONES UR QL STRIP: NEGATIVE
LEUKOCYTE ESTERASE UR QL STRIP.AUTO: NEGATIVE
MUCOUS THREADS URNS QL MICRO: ABNORMAL /HPF
NITRITE UR QL STRIP: NEGATIVE
PH UR STRIP.AUTO: 5.5 [PH] (ref 4.5–8)
PROT UR QL STRIP: ABNORMAL
RBC # UR: ABNORMAL /HPF
REF LAB TEST METHOD: ABNORMAL
SP GR UR STRIP: 1.03 (ref 1–1.03)
SQUAMOUS #/AREA URNS HPF: ABNORMAL /HPF
UROBILINOGEN UR QL STRIP: ABNORMAL
WBC UR QL AUTO: ABNORMAL /HPF

## 2018-10-14 PROCEDURE — 74176 CT ABD & PELVIS W/O CONTRAST: CPT

## 2018-10-14 PROCEDURE — 81001 URINALYSIS AUTO W/SCOPE: CPT | Performed by: EMERGENCY MEDICINE

## 2018-10-14 PROCEDURE — 25010000002 HYDROMORPHONE PER 4 MG: Performed by: EMERGENCY MEDICINE

## 2018-10-14 PROCEDURE — 25010000002 KETOROLAC TROMETHAMINE PER 15 MG: Performed by: EMERGENCY MEDICINE

## 2018-10-14 PROCEDURE — 99284 EMERGENCY DEPT VISIT MOD MDM: CPT

## 2018-10-14 PROCEDURE — 25010000002 ONDANSETRON PER 1 MG: Performed by: EMERGENCY MEDICINE

## 2018-10-14 PROCEDURE — 96374 THER/PROPH/DIAG INJ IV PUSH: CPT

## 2018-10-14 PROCEDURE — 81025 URINE PREGNANCY TEST: CPT | Performed by: EMERGENCY MEDICINE

## 2018-10-14 PROCEDURE — 96375 TX/PRO/DX INJ NEW DRUG ADDON: CPT

## 2018-10-14 PROCEDURE — 99284 EMERGENCY DEPT VISIT MOD MDM: CPT | Performed by: EMERGENCY MEDICINE

## 2018-10-14 RX ORDER — PROMETHAZINE HYDROCHLORIDE 25 MG/1
25 TABLET ORAL EVERY 6 HOURS PRN
Qty: 12 TABLET | Refills: 0 | OUTPATIENT
Start: 2018-10-14 | End: 2020-03-01

## 2018-10-14 RX ORDER — TAMSULOSIN HYDROCHLORIDE 0.4 MG/1
1 CAPSULE ORAL DAILY
Qty: 7 CAPSULE | Refills: 0 | Status: SHIPPED | OUTPATIENT
Start: 2018-10-14 | End: 2018-10-21

## 2018-10-14 RX ORDER — ONDANSETRON 2 MG/ML
4 INJECTION INTRAMUSCULAR; INTRAVENOUS ONCE
Status: COMPLETED | OUTPATIENT
Start: 2018-10-14 | End: 2018-10-14

## 2018-10-14 RX ORDER — HYDROCODONE BITARTRATE AND ACETAMINOPHEN 7.5; 325 MG/1; MG/1
1 TABLET ORAL EVERY 4 HOURS PRN
Qty: 15 TABLET | Refills: 0 | OUTPATIENT
Start: 2018-10-14 | End: 2020-03-01

## 2018-10-14 RX ORDER — SODIUM CHLORIDE 0.9 % (FLUSH) 0.9 %
10 SYRINGE (ML) INJECTION AS NEEDED
Status: DISCONTINUED | OUTPATIENT
Start: 2018-10-14 | End: 2018-10-14 | Stop reason: HOSPADM

## 2018-10-14 RX ORDER — TAMSULOSIN HYDROCHLORIDE 0.4 MG/1
0.4 CAPSULE ORAL ONCE
Status: COMPLETED | OUTPATIENT
Start: 2018-10-14 | End: 2018-10-14

## 2018-10-14 RX ORDER — HYDROMORPHONE HCL 110MG/55ML
0.5 PATIENT CONTROLLED ANALGESIA SYRINGE INTRAVENOUS ONCE
Status: COMPLETED | OUTPATIENT
Start: 2018-10-14 | End: 2018-10-14

## 2018-10-14 RX ORDER — KETOROLAC TROMETHAMINE 30 MG/ML
30 INJECTION, SOLUTION INTRAMUSCULAR; INTRAVENOUS ONCE
Status: COMPLETED | OUTPATIENT
Start: 2018-10-14 | End: 2018-10-14

## 2018-10-14 RX ADMIN — TAMSULOSIN HYDROCHLORIDE 0.4 MG: 0.4 CAPSULE ORAL at 07:13

## 2018-10-14 RX ADMIN — ONDANSETRON 4 MG: 2 INJECTION, SOLUTION INTRAMUSCULAR; INTRAVENOUS at 06:13

## 2018-10-14 RX ADMIN — KETOROLAC TROMETHAMINE 30 MG: 30 INJECTION, SOLUTION INTRAMUSCULAR at 06:14

## 2018-10-14 RX ADMIN — HYDROMORPHONE HYDROCHLORIDE 0.5 MG: 2 INJECTION, SOLUTION INTRAMUSCULAR; INTRAVENOUS; SUBCUTANEOUS at 06:11

## 2018-10-14 NOTE — ED PROVIDER NOTES
"Subjective   History of Present Illness  History of Present Illness    Chief complaint: Flank pain    Location: Left flank    Quality/Severity:  Severe    Timing/Duration: Sudden onset at 0400 hrs.    Modifying Factors: None    Associated Symptoms: Urge to urinate, but can only get a mild dribble.    Narrative: The patient is a 28-year-old white female who presents as noted above.  The patient does have a known history of kidney stones and thinks that she has a another one.  Some nausea without vomiting.    Review of Systems   Constitutional: Negative for activity change, appetite change, fatigue and fever.   HENT: Negative for congestion.    Respiratory: Negative for cough, shortness of breath and wheezing.    Cardiovascular: Negative for chest pain, palpitations and leg swelling.   Gastrointestinal: Positive for nausea. Negative for abdominal pain, diarrhea and vomiting.   Endocrine: Negative for polydipsia.   Genitourinary: Positive for difficulty urinating and flank pain. Negative for dysuria, frequency and urgency.   Musculoskeletal: Negative for back pain.   Skin: Negative for rash.   Neurological: Negative for dizziness, weakness and headaches.   Psychiatric/Behavioral: Negative for confusion.   All other systems reviewed and are negative.      Past Medical History:   Diagnosis Date   • ASCUS of cervix with negative high risk HPV 2014   • Atypical squamous cells of undetermined significance (ASCUS) on Papanicolaou smear of cervix 2012    NO HPV DONE   • Bipolar depression (CMS/[facility])    • Cervical high risk HPV (human papillomavirus) test positive 2015    HPV 16   • Kidney stones    • LGSIL on Pap smear of cervix 2011   • Migraine    • SAB (spontaneous )    • SAB (spontaneous ) 2007       Allergies   Allergen Reactions   • Amoxicillin Hives   • Lamictal [Lamotrigine] Hives     Also states that she \"cannot walk.\"       Past Surgical History:   Procedure Laterality Date "   •  SECTION      x 2    • COLPOSCOPY  2011    NO PATH SENT   • COLPOSCOPY W/ BIOPSY / CURETTAGE  2014    LGSIL   • COLPOSCOPY W/ BIOPSY / CURETTAGE  2012    CHRONIC CERVICITIS AND SQUAMOUS METAPLASIA   • COLPOSCOPY W/ BIOPSY / CURETTAGE  2015    NEGATIVE   • TONSILLECTOMY         Family History   Problem Relation Age of Onset   • Atrial fibrillation Father        Social History     Social History   • Marital status: Single     Social History Main Topics   • Smoking status: Former Smoker     Packs/day: 0.50     Years: 10.00     Types: Electronic Cigarette   • Smokeless tobacco: Never Used   • Alcohol use No      Comment: rare   • Drug use: Yes     Types: Marijuana      Comment: cbd oil   • Sexual activity: Yes     Birth control/ protection: Implant      Comment: NEXPLANON     Other Topics Concern   • Not on file           Objective   Physical Exam   Constitutional: She is oriented to person, place, and time.   The patient is an obese, white female in obvious discomfort due to her flank pain.   HENT:   Head: Normocephalic and atraumatic.   Eyes: Conjunctivae and EOM are normal.   Neck: Normal range of motion. Neck supple.   Cardiovascular: Regular rhythm and normal heart sounds.    No murmur heard.  Borderline tachycardia   Pulmonary/Chest: Effort normal and breath sounds normal. No respiratory distress. She has no wheezes. She has no rales.   Abdominal: Soft. Bowel sounds are normal. She exhibits no distension. There is no tenderness.   Musculoskeletal: Normal range of motion. She exhibits no edema or tenderness.   No CVAT   Neurological: She is alert and oriented to person, place, and time.   Skin: Skin is warm and dry. No rash noted.   Psychiatric: She has a normal mood and affect. Her behavior is normal.   Nursing note and vitals reviewed.      Final diagnoses:   Left ureteral calculus       Procedures           ED Course  ED Course as of Oct 14 0712   Sun Oct 14, 2018   0706 My  contemporaneous wet read on the CT abdomen/pelvis did reveal a stone at the left UVJ with mild to moderate obstruction.  The stone appeared to be approximately 3 mm in diameter.  All results discussed with patient as were the treatment plan, expectations and warnings.  [ML]      ED Course User Index  [ML] Barrera An MD                  MDM  Number of Diagnoses or Management Options  Left ureteral calculus: new and requires workup     Amount and/or Complexity of Data Reviewed  Clinical lab tests: ordered and reviewed  Tests in the radiology section of CPT®: ordered and reviewed  Independent visualization of images, tracings, or specimens: yes    Risk of Complications, Morbidity, and/or Mortality  Presenting problems: high  Diagnostic procedures: high  Management options: high    Patient Progress  Patient progress: improved  My differential diagnosis for abdominal pain includes but is not limited to:  Gastritis, gastroenteritis, peptic ulcer disease, GERD, esophageal perforation, acute appendicitis, mesenteric adenitis, Meckel’s diverticulum, epiploic appendagitis, diverticulitis, colon cancer, ulcerative colitis, Crohn’s disease, intussusception, small bowel obstruction, adhesions, ischemic bowel, perforated viscus, ileus, obstipation, biliary colic, cholecystitis, cholelithiasis, Marty-Gary Brice, hepatitis, pancreatitis, common bile duct obstruction, cholangitis, bile leak, splenic trauma, splenic rupture, splenic infarction, splenic abscess, abdominal abscess, ascites, spontaneous bacterial peritonitis, hernia, UTI, cystitis, prostatitis, ureterolithiasis, urinary obstruction, ovarian cyst, torsion, pregnancy, ectopic pregnancy, PID, pelvic abscess, mittelschmerz, endometriosis, AAA, myocardial infarction, pneumonia, cancer, porphyria, DKA, medications, sickle cell, viral syndrome, zoster    Labs this visit  Lab Results (last 24 hours)     Procedure Component Value Units Date/Time    Urinalysis With  Microscopic If Indicated (No Culture) - Urine, Clean Catch [[id number]]  (Abnormal) Collected:  10/14/18 0617    Specimen:  Urine from Urine, Clean Catch Updated:  10/14/18 0644     Color, UA Yellow     Appearance, UA Clear     pH, UA 5.5     Specific Gravity, UA 1.028     Comment: Result obtained by Refractometer        Glucose, UA Negative     Ketones, UA Negative     Bilirubin, UA Negative     Blood, UA Large (3+) (A)     Protein, UA Trace (A)     Leuk Esterase, UA Negative     Nitrite, UA Negative     Urobilinogen, UA 0.2 E.U./dL    Pregnancy, Urine - Urine, Clean Catch [[id number]]  (Normal) Collected:  10/14/18 0617    Specimen:  Urine from Urine, Clean Catch Updated:  10/14/18 0634     HCG, Urine QL Negative    Urinalysis, Microscopic Only - Urine, Clean Catch [[id number]]  (Abnormal) Collected:  10/14/18 0617    Specimen:  Urine from Urine, Clean Catch Updated:  10/14/18 0644     RBC, UA 31-50 (A) /HPF      WBC, UA 0-2 (A) /HPF      Bacteria, UA Trace (A) /HPF      Squamous Epithelial Cells, UA 3-6 (A) /HPF      Hyaline Casts, UA None Seen /LPF      Mucus, UA Trace /HPF      Methodology Manual Light Microscopy        Prescribed on discharge             Medication List      New Prescriptions    HYDROcodone-acetaminophen 7.5-325 MG per tablet  Commonly known as:  NORCO  Take 1 tablet by mouth Every 4 (Four) Hours As Needed for Moderate Pain    for up to 15 doses.     promethazine 25 MG tablet  Commonly known as:  PHENERGAN  Take 1 tablet by mouth Every 6 (Six) Hours As Needed for Nausea or   Vomiting for up to 12 doses.     tamsulosin 0.4 MG capsule 24 hr capsule  Commonly known as:  FLOMAX  Take 1 capsule by mouth Daily for 7 doses.        Stop    CBD oral oil  Commonly known as:  cannabidiol          All lab results, imaging results and other tests were reviewed by Barrera An MD and unless otherwise specified were found to be unremarkable.      Final diagnoses:   Left ureteral calculus             Barrera An MD  10/14/18 0713

## 2020-02-29 ENCOUNTER — APPOINTMENT (OUTPATIENT)
Dept: CT IMAGING | Facility: HOSPITAL | Age: 30
End: 2020-02-29

## 2020-02-29 ENCOUNTER — HOSPITAL ENCOUNTER (EMERGENCY)
Facility: HOSPITAL | Age: 30
Discharge: HOME | End: 2020-03-01
Attending: EMERGENCY MEDICINE | Admitting: EMERGENCY MEDICINE

## 2020-02-29 DIAGNOSIS — R03.0 ELEVATED BLOOD PRESSURE READING: ICD-10-CM

## 2020-02-29 DIAGNOSIS — N30.01 ACUTE HEMORRHAGIC CYSTITIS: Primary | ICD-10-CM

## 2020-02-29 LAB
ALBUMIN SERPL-MCNC: 4.1 G/DL (ref 3.5–5.2)
ALBUMIN/GLOB SERPL: 1.2 G/DL
ALP SERPL-CCNC: 80 U/L (ref 39–117)
ALT SERPL W P-5'-P-CCNC: 12 U/L (ref 1–33)
AMORPH URATE CRY URNS QL MICRO: ABNORMAL /HPF
ANION GAP SERPL CALCULATED.3IONS-SCNC: 10.2 MMOL/L (ref 5–15)
AST SERPL-CCNC: 16 U/L (ref 1–32)
B-HCG UR QL: NEGATIVE
BACTERIA UR QL AUTO: ABNORMAL /HPF
BASOPHILS # BLD AUTO: 0.03 10*3/MM3 (ref 0–0.2)
BASOPHILS NFR BLD AUTO: 0.3 % (ref 0–1.5)
BILIRUB SERPL-MCNC: 0.2 MG/DL (ref 0.2–1.2)
BILIRUB UR QL STRIP: NEGATIVE
BUN BLD-MCNC: 10 MG/DL (ref 6–20)
BUN/CREAT SERPL: 15.4 (ref 7–25)
CALCIUM SPEC-SCNC: 9.4 MG/DL (ref 8.6–10.5)
CHLORIDE SERPL-SCNC: 101 MMOL/L (ref 98–107)
CLARITY UR: ABNORMAL
CO2 SERPL-SCNC: 28.8 MMOL/L (ref 22–29)
COLOR UR: YELLOW
CREAT BLD-MCNC: 0.65 MG/DL (ref 0.57–1)
DEPRECATED RDW RBC AUTO: 39.4 FL (ref 37–54)
EOSINOPHIL # BLD AUTO: 0.09 10*3/MM3 (ref 0–0.4)
EOSINOPHIL NFR BLD AUTO: 0.9 % (ref 0.3–6.2)
ERYTHROCYTE [DISTWIDTH] IN BLOOD BY AUTOMATED COUNT: 12.9 % (ref 12.3–15.4)
GFR SERPL CREATININE-BSD FRML MDRD: 109 ML/MIN/1.73
GLOBULIN UR ELPH-MCNC: 3.5 GM/DL
GLUCOSE BLD-MCNC: 110 MG/DL (ref 65–99)
GLUCOSE UR STRIP-MCNC: NEGATIVE MG/DL
HCT VFR BLD AUTO: 41.3 % (ref 34–46.6)
HGB BLD-MCNC: 13.3 G/DL (ref 12–15.9)
HGB UR QL STRIP.AUTO: ABNORMAL
HYALINE CASTS UR QL AUTO: ABNORMAL /LPF
IMM GRANULOCYTES # BLD AUTO: 0.04 10*3/MM3 (ref 0–0.05)
IMM GRANULOCYTES NFR BLD AUTO: 0.4 % (ref 0–0.5)
KETONES UR QL STRIP: NEGATIVE
LEUKOCYTE ESTERASE UR QL STRIP.AUTO: ABNORMAL
LYMPHOCYTES # BLD AUTO: 3 10*3/MM3 (ref 0.7–3.1)
LYMPHOCYTES NFR BLD AUTO: 28.5 % (ref 19.6–45.3)
MCH RBC QN AUTO: 27.1 PG (ref 26.6–33)
MCHC RBC AUTO-ENTMCNC: 32.2 G/DL (ref 31.5–35.7)
MCV RBC AUTO: 84.1 FL (ref 79–97)
MONOCYTES # BLD AUTO: 0.44 10*3/MM3 (ref 0.1–0.9)
MONOCYTES NFR BLD AUTO: 4.2 % (ref 5–12)
NEUTROPHILS # BLD AUTO: 6.93 10*3/MM3 (ref 1.7–7)
NEUTROPHILS NFR BLD AUTO: 65.7 % (ref 42.7–76)
NITRITE UR QL STRIP: NEGATIVE
NRBC BLD AUTO-RTO: 0 /100 WBC (ref 0–0.2)
PH UR STRIP.AUTO: 7.5 [PH] (ref 4.5–8)
PLATELET # BLD AUTO: 371 10*3/MM3 (ref 140–450)
PMV BLD AUTO: 8.3 FL (ref 6–12)
POTASSIUM BLD-SCNC: 3.2 MMOL/L (ref 3.5–5.2)
PROT SERPL-MCNC: 7.6 G/DL (ref 6–8.5)
PROT UR QL STRIP: ABNORMAL
RBC # BLD AUTO: 4.91 10*6/MM3 (ref 3.77–5.28)
RBC # UR: ABNORMAL /HPF
REF LAB TEST METHOD: ABNORMAL
SODIUM BLD-SCNC: 140 MMOL/L (ref 136–145)
SP GR UR STRIP: 1.02 (ref 1–1.03)
SQUAMOUS #/AREA URNS HPF: ABNORMAL /HPF
UROBILINOGEN UR QL STRIP: ABNORMAL
WBC NRBC COR # BLD: 10.53 10*3/MM3 (ref 3.4–10.8)
WBC UR QL AUTO: ABNORMAL /HPF

## 2020-02-29 PROCEDURE — 81025 URINE PREGNANCY TEST: CPT | Performed by: EMERGENCY MEDICINE

## 2020-02-29 PROCEDURE — 99284 EMERGENCY DEPT VISIT MOD MDM: CPT

## 2020-02-29 PROCEDURE — 80053 COMPREHEN METABOLIC PANEL: CPT | Performed by: EMERGENCY MEDICINE

## 2020-02-29 PROCEDURE — 85025 COMPLETE CBC W/AUTO DIFF WBC: CPT | Performed by: EMERGENCY MEDICINE

## 2020-02-29 PROCEDURE — 81001 URINALYSIS AUTO W/SCOPE: CPT | Performed by: EMERGENCY MEDICINE

## 2020-02-29 PROCEDURE — 99284 EMERGENCY DEPT VISIT MOD MDM: CPT | Performed by: EMERGENCY MEDICINE

## 2020-02-29 PROCEDURE — 74176 CT ABD & PELVIS W/O CONTRAST: CPT

## 2020-02-29 RX ORDER — SODIUM CHLORIDE 0.9 % (FLUSH) 0.9 %
10 SYRINGE (ML) INJECTION AS NEEDED
Status: DISCONTINUED | OUTPATIENT
Start: 2020-02-29 | End: 2020-03-01 | Stop reason: HOSPADM

## 2020-03-01 VITALS
HEART RATE: 81 BPM | OXYGEN SATURATION: 98 % | TEMPERATURE: 98.5 F | DIASTOLIC BLOOD PRESSURE: 80 MMHG | SYSTOLIC BLOOD PRESSURE: 128 MMHG | RESPIRATION RATE: 18 BRPM | BODY MASS INDEX: 46.01 KG/M2 | WEIGHT: 250 LBS | HEIGHT: 62 IN

## 2020-03-01 PROCEDURE — 25010000002 CEFTRIAXONE SODIUM-DEXTROSE 2-2.22 GM-%(50ML) RECONSTITUTED SOLUTION: Performed by: EMERGENCY MEDICINE

## 2020-03-01 PROCEDURE — 25010000002 KETOROLAC TROMETHAMINE PER 15 MG: Performed by: EMERGENCY MEDICINE

## 2020-03-01 PROCEDURE — 96375 TX/PRO/DX INJ NEW DRUG ADDON: CPT

## 2020-03-01 PROCEDURE — 96365 THER/PROPH/DIAG IV INF INIT: CPT

## 2020-03-01 RX ORDER — KETOROLAC TROMETHAMINE 30 MG/ML
30 INJECTION, SOLUTION INTRAMUSCULAR; INTRAVENOUS ONCE
Status: COMPLETED | OUTPATIENT
Start: 2020-03-01 | End: 2020-03-01

## 2020-03-01 RX ORDER — CEFTRIAXONE 2 G/50ML
2 INJECTION, SOLUTION INTRAVENOUS ONCE
Status: COMPLETED | OUTPATIENT
Start: 2020-03-01 | End: 2020-03-01

## 2020-03-01 RX ORDER — HYDROCODONE BITARTRATE AND ACETAMINOPHEN 5; 325 MG/1; MG/1
1-2 TABLET ORAL EVERY 4 HOURS PRN
Qty: 12 TABLET | Refills: 0 | OUTPATIENT
Start: 2020-03-01 | End: 2021-11-29

## 2020-03-01 RX ORDER — CEFUROXIME AXETIL 500 MG/1
500 TABLET ORAL 2 TIMES DAILY
Qty: 20 TABLET | Refills: 0 | Status: SHIPPED | OUTPATIENT
Start: 2020-03-01 | End: 2020-03-11

## 2020-03-01 RX ADMIN — CEFTRIAXONE 2 G: 2 INJECTION, SOLUTION INTRAVENOUS at 00:17

## 2020-03-01 RX ADMIN — KETOROLAC TROMETHAMINE 30 MG: 30 INJECTION, SOLUTION INTRAMUSCULAR at 00:14

## 2020-03-01 NOTE — ED PROVIDER NOTES
Subjective     History provided by:  Patient and medical records    History of Present Illness    · Chief complaint: Blood in her urine and urinary frequency.  Flank and abdominal pain.    · Location: Left flank and left lower quadrant abdominal pain.    · Quality/Severity: The patient urinary urgency and urinary frequency with trace amounts of blood in her urine.  Left flank and left lower quadrant abdominal pain.    · Timing/Onset: Symptoms started yesterday.    · Modifying Factors: No aggravating or relieving factors.    · Associated symptoms: Denies dysuria.  Denies fever.  Denies nausea or vomiting.    · Narrative: The patient is a 29-year-old white female complaining of a 24-hour history of hematuria, urinary urgency and urinary frequency without dysuria.  She is also has some left flank and left lower quadrant abdominal pain.  The patient has a history of a previous left ureteral stone October 2018.  Last menstrual period was 2 weeks ago and she is on a birth control patch.    Review of Systems   Constitutional: Negative for activity change, appetite change, chills, diaphoresis, fatigue and fever.   HENT: Negative for congestion, dental problem, ear pain, hearing loss, mouth sores, postnasal drip, rhinorrhea, sinus pressure, sore throat and voice change.    Eyes: Negative for photophobia, pain, discharge, redness and visual disturbance.   Respiratory: Negative for cough, chest tightness, shortness of breath, wheezing and stridor.    Cardiovascular: Negative for chest pain, palpitations and leg swelling.   Gastrointestinal: Positive for abdominal pain ( Left lower quadrant). Negative for diarrhea, nausea and vomiting.   Genitourinary: Positive for difficulty urinating, flank pain, frequency, hematuria and urgency. Negative for dysuria, pelvic pain, vaginal bleeding and vaginal discharge.   Musculoskeletal: Negative for arthralgias, back pain, gait problem, joint swelling, myalgias, neck pain and neck  "stiffness.   Skin: Negative for color change and rash.   Neurological: Negative for dizziness, tremors, seizures, syncope, facial asymmetry, speech difficulty, weakness, light-headedness, numbness and headaches.   Hematological: Negative for adenopathy.   Psychiatric/Behavioral: Negative.  Negative for confusion and decreased concentration. The patient is not nervous/anxious.      Past Medical History:   Diagnosis Date   • ASCUS of cervix with negative high risk HPV 2014   • Atypical squamous cells of undetermined significance (ASCUS) on Papanicolaou smear of cervix 2012    NO HPV DONE   • Bipolar depression (CMS/HCC)    • Cervical high risk HPV (human papillomavirus) test positive 2015    HPV 16   • Kidney stones    • LGSIL on Pap smear of cervix 2011   • Migraine    • SAB (spontaneous )    • SAB (spontaneous )      /80 (BP Location: Right arm, Patient Position: Lying)   Pulse 81   Temp 98.5 °F (36.9 °C) (Oral)   Resp 18   Ht 157.5 cm (62\")   Wt 113 kg (250 lb)   LMP 02/15/2020 (Approximate)   SpO2 98%   BMI 45.73 kg/m²     Past Medical History:   Diagnosis Date   • ASCUS of cervix with negative high risk HPV 2014   • Atypical squamous cells of undetermined significance (ASCUS) on Papanicolaou smear of cervix 2012    NO HPV DONE   • Bipolar depression (CMS/HCC)    • Cervical high risk HPV (human papillomavirus) test positive 2015    HPV 16   • Kidney stones    • LGSIL on Pap smear of cervix 2011   • Migraine    • SAB (spontaneous )    • SAB (spontaneous )        Allergies   Allergen Reactions   • Amoxicillin Hives   • Lamictal [Lamotrigine] Hives     Also states that she \"cannot walk.\"       Past Surgical History:   Procedure Laterality Date   •  SECTION      x 2    • COLPOSCOPY  2011    NO PATH SENT   • COLPOSCOPY W/ BIOPSY / CURETTAGE  2014    LGSIL   • COLPOSCOPY W/ BIOPSY / CURETTAGE "  02/08/2012    CHRONIC CERVICITIS AND SQUAMOUS METAPLASIA   • COLPOSCOPY W/ BIOPSY / CURETTAGE  02/17/2015    NEGATIVE   • TONSILLECTOMY         Family History   Problem Relation Age of Onset   • Atrial fibrillation Father        Social History     Socioeconomic History   • Marital status: Single     Spouse name: Not on file   • Number of children: Not on file   • Years of education: Not on file   • Highest education level: Not on file   Tobacco Use   • Smoking status: Former Smoker     Packs/day: 0.50     Years: 10.00     Pack years: 5.00     Types: Electronic Cigarette   • Smokeless tobacco: Never Used   • Tobacco comment: cigarettes and e-cigarettes    Substance and Sexual Activity   • Alcohol use: No     Comment: rare   • Drug use: Not Currently     Types: Marijuana     Comment: previous cbd oil   • Sexual activity: Yes     Birth control/protection: Implant     Comment: NEXPLANON           Objective   Physical Exam   Constitutional: She is oriented to person, place, and time. She appears well-developed and well-nourished. No distress.   The patient appears in no acute distress and does not appear toxic.  She is morbidly obese.  Review of her vital signs: She is afebrile with a temperature 98.5, tachycardic with a heart rate of 114, blood pressure initially elevated 185/130, respirations normal 18 with a normal oxygen saturation of 98% on room air.   HENT:   Head: Normocephalic and atraumatic.   Nose: Nose normal.   Mouth/Throat: Oropharynx is clear and moist. No oropharyngeal exudate.   Eyes: Pupils are equal, round, and reactive to light. EOM are normal. Right eye exhibits no discharge. Left eye exhibits no discharge. No scleral icterus.   Neck: Normal range of motion. Neck supple. No JVD present. No thyromegaly present.   Cardiovascular: Normal rate, regular rhythm and normal heart sounds.   No murmur heard.  Pulmonary/Chest: Effort normal and breath sounds normal. She has no wheezes. She has no rales. She  exhibits no tenderness.   Abdominal: Soft. Bowel sounds are normal. She exhibits no distension and no mass. There is no tenderness. There is no rebound and no guarding.   Musculoskeletal: Normal range of motion. She exhibits no edema, tenderness or deformity.   No flank tenderness to percussion.   Lymphadenopathy:     She has no cervical adenopathy.   Neurological: She is alert and oriented to person, place, and time. No cranial nerve deficit. Coordination normal.   No focal motor sensory deficit   Skin: Skin is warm and dry. Capillary refill takes less than 2 seconds. No rash noted. She is not diaphoretic.   Psychiatric: She has a normal mood and affect. Her behavior is normal. Judgment and thought content normal.   Nursing note and vitals reviewed.      Procedures           ED Course  ED Course as of Mar 01 0106   Sat Feb 29, 2020 2258 [facility] report [id number] is blank    [TP]   Sun Mar 01, 2020   0009 Review the patient's test results: Her urinalysis was positive for leukocyte esterases.  Microscopic exam of the urine revealed 31-50 red blood cells and 6-12 WBCs with 1+ bacteria consistent with hemorrhagic cystitis.  CT of the abdomen pelvis without contrast was interpreted by the radiologist as a 1 mm nonobstructing left renal stone and a 17 mm left ovarian cyst.  No obstructing ureteral stone seen.  Her CBC had a normal white count of 10.5 with a normal differential.  Hemoglobin, hematocrit and platelets within normal limits.  CMP had a slightly low potassium of 3.2, otherwise normal electrolytes, normal renal liver function test.    [TP]   0011 The patient was administered Toradol 30 mg IV for pain.  Antibiotic therapy for her hemorrhagic cystitis was initiated with Rocephin 2 g IV.    [TP]   0011 The patient's blood pressure did drop to 128/80 at discharge.    [TP]   0105 The patient was discharged with prescription for Ceftin 500 mg to be taken twice a day for 10 days.  She was also prescribed Lortab 5 mg  #12 for pain.  She is instructed to follow-up with her PCP in 3 days.    [TP]      ED Course User Index  [TP] Lukasz Contreras MD                                           MDM  Number of Diagnoses or Management Options  Acute hemorrhagic cystitis: new and requires workup  Elevated blood pressure reading: new and does not require workup     Amount and/or Complexity of Data Reviewed  Clinical lab tests: ordered and reviewed  Tests in the radiology section of CPT®: ordered and reviewed  Independent visualization of images, tracings, or specimens: yes    Risk of Complications, Morbidity, and/or Mortality  Presenting problems: high  Diagnostic procedures: high  Management options: high  General comments: My differential diagnosis for back pain includes but is not limited to:  Musculoskeletal strain, contusion, retroperitoneal hematoma, disc protrusion, vertebral fracture, transverse process fracture, rib fracture, facet syndrome, sacroiliac joint strain, sciatica, renal injury, splenic injury, pancreatic injury, osteoarthritis, lumbar spondylosis, spinal stenosis, ankylosing spondylitis, sacroiliac joint inflammation, pancreatitis, perforated peptic ulcer, diverticulitis, endometriosis, chronic PID, epidural abscess, osteomyelitis, retroperitoneal abscess, pyelonephritis, pneumonia, subphrenic abscess, tuberculosis, neurofibroma, meningioma, multiple myeloma, lymphoma, metastatic cancer, primary cancer, AAA, aortic dissection, spinal ischemia, referred pain, ureterolithiasis    Patient Progress  Patient progress: improved      Final diagnoses:   Acute hemorrhagic cystitis   Elevated blood pressure reading           Labs Reviewed   URINALYSIS W/ MICROSCOPIC IF INDICATED (NO CULTURE) - Abnormal; Notable for the following components:       Result Value    Appearance, UA Cloudy (*)     Blood, UA Large (3+) (*)     Protein, UA 30 mg/dL (1+) (*)     Leuk Esterase, UA Small (1+) (*)     All other components within normal limits    COMPREHENSIVE METABOLIC PANEL - Abnormal; Notable for the following components:    Glucose 110 (*)     Potassium 3.2 (*)     All other components within normal limits    Narrative:     GFR Normal >60  Chronic Kidney Disease <60  Kidney Failure <15     CBC WITH AUTO DIFFERENTIAL - Abnormal; Notable for the following components:    Monocyte % 4.2 (*)     All other components within normal limits   URINALYSIS, MICROSCOPIC ONLY - Abnormal; Notable for the following components:    RBC, UA 31-50 (*)     WBC, UA 6-12 (*)     Bacteria, UA 1+ (*)     Squamous Epithelial Cells, UA 3-6 (*)     All other components within normal limits   PREGNANCY, URINE - Normal   CBC AND DIFFERENTIAL    Narrative:     The following orders were created for panel order CBC & Differential.  Procedure                               Abnormality         Status                     ---------                               -----------         ------                     CBC Auto Differential[[id number]]        Abnormal            Final result                 Please view results for these tests on the individual orders.     CT Abdomen Pelvis Without Contrast   ED Interpretation   1 mm nonobstructing left renal stone      There may be a 17 mm cyst in the left ovary. Otherwise study is normal               Signer Name: Philip Lomeli MD    Signed: 2/29/2020 11:55 PM    Workstation Name: [facility]     Radiology [facility]      Final Result   1 mm nonobstructing left renal stone      There may be a 17 mm cyst in the left ovary. Otherwise study is normal               Signer Name: Philip Lomeli MD    Signed: 2/29/2020 11:55 PM    Workstation Name: [facility]     [facility]             Medication List      New Prescriptions    cefuroxime 500 MG tablet  Commonly known as:  CEFTIN  Take 1 tablet by mouth 2 (Two) Times a Day for 10 days.     HYDROcodone-acetaminophen 5-325 MG per tablet  Commonly known as:  NORCO  Take 1-2  tablets by mouth Every 4 (Four) Hours As Needed for Severe Pain .  Replaces:  HYDROcodone-acetaminophen 7.5-325 MG per tablet        Stop    HYDROcodone-acetaminophen 7.5-325 MG per tablet  Commonly known as:  NORCO  Replaced by:  HYDROcodone-acetaminophen 5-325 MG per tablet     promethazine 25 MG tablet  Commonly known as:  PHENERGAN               Lukasz Contreras MD  03/01/20 0106

## 2020-03-14 ENCOUNTER — HOSPITAL ENCOUNTER (EMERGENCY)
Facility: HOSPITAL | Age: 30
Discharge: HOME | End: 2020-03-14
Attending: EMERGENCY MEDICINE | Admitting: EMERGENCY MEDICINE

## 2020-03-14 VITALS
WEIGHT: 249.12 LBS | TEMPERATURE: 98 F | HEART RATE: 110 BPM | SYSTOLIC BLOOD PRESSURE: 167 MMHG | OXYGEN SATURATION: 100 % | BODY MASS INDEX: 45.56 KG/M2 | RESPIRATION RATE: 16 BRPM | DIASTOLIC BLOOD PRESSURE: 112 MMHG

## 2020-03-14 DIAGNOSIS — R11.2 NON-INTRACTABLE VOMITING WITH NAUSEA, UNSPECIFIED VOMITING TYPE: ICD-10-CM

## 2020-03-14 DIAGNOSIS — R10.9 LEFT FLANK PAIN: Primary | ICD-10-CM

## 2020-03-14 LAB
ALBUMIN SERPL-MCNC: 4.1 G/DL (ref 3.5–5.2)
ALBUMIN/GLOB SERPL: 1.1 G/DL
ALP SERPL-CCNC: 70 U/L (ref 39–117)
ALT SERPL W P-5'-P-CCNC: 11 U/L (ref 1–33)
ANION GAP SERPL CALCULATED.3IONS-SCNC: 13.3 MMOL/L (ref 5–15)
AST SERPL-CCNC: 14 U/L (ref 1–32)
B-HCG UR QL: NEGATIVE
BACTERIA UR QL AUTO: ABNORMAL /HPF
BASOPHILS # BLD AUTO: 0.02 10*3/MM3 (ref 0–0.2)
BASOPHILS NFR BLD AUTO: 0.2 % (ref 0–1.5)
BILIRUB SERPL-MCNC: 0.3 MG/DL (ref 0.2–1.2)
BILIRUB UR QL STRIP: NEGATIVE
BUN BLD-MCNC: 7 MG/DL (ref 6–20)
BUN/CREAT SERPL: 13.5 (ref 7–25)
CALCIUM SPEC-SCNC: 9 MG/DL (ref 8.6–10.5)
CHLORIDE SERPL-SCNC: 99 MMOL/L (ref 98–107)
CLARITY UR: CLEAR
CO2 SERPL-SCNC: 22.7 MMOL/L (ref 22–29)
COLOR UR: YELLOW
CREAT BLD-MCNC: 0.52 MG/DL (ref 0.57–1)
DEPRECATED RDW RBC AUTO: 38.7 FL (ref 37–54)
EOSINOPHIL # BLD AUTO: 0.07 10*3/MM3 (ref 0–0.4)
EOSINOPHIL NFR BLD AUTO: 0.9 % (ref 0.3–6.2)
ERYTHROCYTE [DISTWIDTH] IN BLOOD BY AUTOMATED COUNT: 12.7 % (ref 12.3–15.4)
GFR SERPL CREATININE-BSD FRML MDRD: 140 ML/MIN/1.73
GLOBULIN UR ELPH-MCNC: 3.7 GM/DL
GLUCOSE BLD-MCNC: 99 MG/DL (ref 65–99)
GLUCOSE UR STRIP-MCNC: NEGATIVE MG/DL
HCT VFR BLD AUTO: 43.9 % (ref 34–46.6)
HGB BLD-MCNC: 14.1 G/DL (ref 12–15.9)
HGB UR QL STRIP.AUTO: NEGATIVE
HYALINE CASTS UR QL AUTO: ABNORMAL /LPF
IMM GRANULOCYTES # BLD AUTO: 0.01 10*3/MM3 (ref 0–0.05)
IMM GRANULOCYTES NFR BLD AUTO: 0.1 % (ref 0–0.5)
KETONES UR QL STRIP: NEGATIVE
LEUKOCYTE ESTERASE UR QL STRIP.AUTO: NEGATIVE
LYMPHOCYTES # BLD AUTO: 2.08 10*3/MM3 (ref 0.7–3.1)
LYMPHOCYTES NFR BLD AUTO: 25.5 % (ref 19.6–45.3)
MCH RBC QN AUTO: 26.5 PG (ref 26.6–33)
MCHC RBC AUTO-ENTMCNC: 32.1 G/DL (ref 31.5–35.7)
MCV RBC AUTO: 82.5 FL (ref 79–97)
MONOCYTES # BLD AUTO: 0.23 10*3/MM3 (ref 0.1–0.9)
MONOCYTES NFR BLD AUTO: 2.8 % (ref 5–12)
NEUTROPHILS # BLD AUTO: 5.75 10*3/MM3 (ref 1.7–7)
NEUTROPHILS NFR BLD AUTO: 70.5 % (ref 42.7–76)
NITRITE UR QL STRIP: NEGATIVE
PH UR STRIP.AUTO: 6 [PH] (ref 4.5–8)
PLATELET # BLD AUTO: 412 10*3/MM3 (ref 140–450)
PMV BLD AUTO: 8.1 FL (ref 6–12)
POTASSIUM BLD-SCNC: 3.5 MMOL/L (ref 3.5–5.2)
PROT SERPL-MCNC: 7.8 G/DL (ref 6–8.5)
PROT UR QL STRIP: ABNORMAL
RBC # BLD AUTO: 5.32 10*6/MM3 (ref 3.77–5.28)
RBC # UR: ABNORMAL /HPF
REF LAB TEST METHOD: ABNORMAL
SODIUM BLD-SCNC: 135 MMOL/L (ref 136–145)
SP GR UR STRIP: 1.02 (ref 1–1.03)
SQUAMOUS #/AREA URNS HPF: ABNORMAL /HPF
UROBILINOGEN UR QL STRIP: ABNORMAL
WBC NRBC COR # BLD: 8.16 10*3/MM3 (ref 3.4–10.8)
WBC UR QL AUTO: ABNORMAL /HPF

## 2020-03-14 PROCEDURE — 85025 COMPLETE CBC W/AUTO DIFF WBC: CPT | Performed by: PHYSICIAN ASSISTANT

## 2020-03-14 PROCEDURE — 81001 URINALYSIS AUTO W/SCOPE: CPT | Performed by: PHYSICIAN ASSISTANT

## 2020-03-14 PROCEDURE — 81025 URINE PREGNANCY TEST: CPT | Performed by: PHYSICIAN ASSISTANT

## 2020-03-14 PROCEDURE — 96375 TX/PRO/DX INJ NEW DRUG ADDON: CPT

## 2020-03-14 PROCEDURE — 80053 COMPREHEN METABOLIC PANEL: CPT | Performed by: PHYSICIAN ASSISTANT

## 2020-03-14 PROCEDURE — 99283 EMERGENCY DEPT VISIT LOW MDM: CPT

## 2020-03-14 PROCEDURE — 99284 EMERGENCY DEPT VISIT MOD MDM: CPT | Performed by: PHYSICIAN ASSISTANT

## 2020-03-14 PROCEDURE — 96374 THER/PROPH/DIAG INJ IV PUSH: CPT

## 2020-03-14 PROCEDURE — 25010000002 ONDANSETRON PER 1 MG: Performed by: PHYSICIAN ASSISTANT

## 2020-03-14 PROCEDURE — 25010000002 KETOROLAC TROMETHAMINE PER 15 MG: Performed by: PHYSICIAN ASSISTANT

## 2020-03-14 RX ORDER — SODIUM CHLORIDE 0.9 % (FLUSH) 0.9 %
10 SYRINGE (ML) INJECTION AS NEEDED
Status: DISCONTINUED | OUTPATIENT
Start: 2020-03-14 | End: 2020-03-14 | Stop reason: HOSPADM

## 2020-03-14 RX ORDER — ONDANSETRON 2 MG/ML
4 INJECTION INTRAMUSCULAR; INTRAVENOUS ONCE
Status: COMPLETED | OUTPATIENT
Start: 2020-03-14 | End: 2020-03-14

## 2020-03-14 RX ORDER — KETOROLAC TROMETHAMINE 30 MG/ML
30 INJECTION, SOLUTION INTRAMUSCULAR; INTRAVENOUS ONCE
Status: COMPLETED | OUTPATIENT
Start: 2020-03-14 | End: 2020-03-14

## 2020-03-14 RX ADMIN — KETOROLAC TROMETHAMINE 30 MG: 30 INJECTION, SOLUTION INTRAMUSCULAR at 13:18

## 2020-03-14 RX ADMIN — ONDANSETRON HYDROCHLORIDE 4 MG: 2 INJECTION, SOLUTION INTRAMUSCULAR; INTRAVENOUS at 13:25

## 2020-03-14 RX ADMIN — SODIUM CHLORIDE, POTASSIUM CHLORIDE, SODIUM LACTATE AND CALCIUM CHLORIDE 1000 ML: 600; 310; 30; 20 INJECTION, SOLUTION INTRAVENOUS at 13:25

## 2020-03-14 NOTE — ED PROVIDER NOTES
EMERGENCY DEPARTMENT ENCOUNTER      Room Number: 2/02    History is provided by the patient, no translation services needed    HPI:    Chief complaint: Flank pain, vomiting    Location: Left flank, radiates to left abdomen    Quality/Severity: 7/10, sharp    Timing/Duration: Ongoing for the past 2 weeks    Modifying Factors: Patient was seen in this ED on 2/29/2020, and at [facility] on 3/3/2020.     Associated Symptoms: Positive for flank pain and vomiting.  Denies any dysuria, urgency or frequency to urinate.  Denies any fevers or chills.    Narrative: Pt is a 29 y.o. female who presents complaining of left flank pain and vomiting x2 weeks.  Patient states she has a PMH significant for kidney stones.  She has a known left renal stone 1 mm in diameter that was seen on CT on 2/29/2020.  She was treated with IV Rocephin, and a 10-day course of Ceftin.  She states she finished entire course of antibiotics as prescribed.  She had not felt better on 3/3/2020 so she was seen at [facility] emergency department for same.  UA had significantly improved at that visit, urine culture was negative.  No urine culture was obtained prior to antibiotic treatment.  Patient states her pain has been unchanged and constant throughout the past 2 weeks.  She has had more vomiting for the past 3 days.  She states she vomits approximately twice daily, she is able to keep down fluids.  She states the pain does radiate from her left flank into her abdomen at times, but denies any specific abdominal pain.  She states she has been having looser stools for the past couple of weeks but denies any diarrhea, or black or bloody stools.      PMD: Hansa Daniels MD    REVIEW OF SYSTEMS  Review of Systems   Constitutional: Negative for chills and fever.   Respiratory: Negative for cough and shortness of breath.    Cardiovascular: Negative for chest pain and palpitations.   Gastrointestinal: Positive for  nausea and vomiting. Negative for abdominal pain.   Genitourinary: Positive for flank pain. Negative for difficulty urinating, dysuria and frequency.   Musculoskeletal: Negative for arthralgias and myalgias.   Skin: Negative for rash and wound.   Neurological: Negative for dizziness and syncope.   Psychiatric/Behavioral: Negative for confusion. The patient is not nervous/anxious.          PAST MEDICAL HISTORY  Active Ambulatory Problems     Diagnosis Date Noted   • Nausea 2017   • Diarrhea 2017   • Ear fullness, bilateral 2017     Resolved Ambulatory Problems     Diagnosis Date Noted   • No Resolved Ambulatory Problems     Past Medical History:   Diagnosis Date   • ASCUS of cervix with negative high risk HPV 2014   • Atypical squamous cells of undetermined significance (ASCUS) on Papanicolaou smear of cervix 2012   • Bipolar depression (CMS/[facility])    • Cervical high risk HPV (human papillomavirus) test positive 2015   • Kidney stones    • LGSIL on Pap smear of cervix 2011   • Migraine    • SAB (spontaneous )    • SAB (spontaneous )        PAST SURGICAL HISTORY  Past Surgical History:   Procedure Laterality Date   •  SECTION      x 2    • COLPOSCOPY  2011    NO PATH SENT   • COLPOSCOPY W/ BIOPSY / CURETTAGE  2014    LGSIL   • COLPOSCOPY W/ BIOPSY / CURETTAGE  2012    CHRONIC CERVICITIS AND SQUAMOUS METAPLASIA   • COLPOSCOPY W/ BIOPSY / CURETTAGE  2015    NEGATIVE   • TONSILLECTOMY         FAMILY HISTORY  Family History   Problem Relation Age of Onset   • Atrial fibrillation Father        SOCIAL HISTORY  Social History     Socioeconomic History   • Marital status: Single     Spouse name: Not on file   • Number of children: Not on file   • Years of education: Not on file   • Highest education level: Not on file   Tobacco Use   • Smoking status: Former Smoker     Packs/day: 0.50     Years: 10.00     Pack years: 5.00      Types: Electronic Cigarette   • Smokeless tobacco: Never Used   • Tobacco comment: cigarettes and e-cigarettes    Substance and Sexual Activity   • Alcohol use: No     Comment: rare   • Drug use: Not Currently     Types: Marijuana     Comment: previous cbd oil   • Sexual activity: Yes     Birth control/protection: Implant     Comment: NEXPLANON       ALLERGIES  Amoxicillin and Lamictal [lamotrigine]      Current Facility-Administered Medications:   •  [COMPLETED] Insert peripheral IV, , , Once **AND** sodium chloride 0.9 % flush 10 mL, 10 mL, Intravenous, PRN, Shelbi Milner PA-C    Current Outpatient Medications:   •  HYDROcodone-acetaminophen (NORCO) 5-325 MG per tablet, Take 1-2 tablets by mouth Every 4 (Four) Hours As Needed for Severe Pain ., Disp: 12 tablet, Rfl: 0  •  Norelgestromin-Eth Estradiol (XULANE TD), Place  on the skin as directed by provider., Disp: , Rfl:   •  SUMAtriptan (IMITREX) 50 MG tablet, Take 1 tablet by mouth Every 2 (Two) Hours As Needed for Migraine. May repeat dose one time in 2 hours if headache not relieved., Disp: 9 tablet, Rfl: 0    PHYSICAL EXAM  ED Triage Vitals   Temp Pulse Resp BP SpO2   -- -- -- -- --      Temp src Heart Rate Source Patient Position BP Location FiO2 (%)   -- -- -- -- --       Physical Exam   Constitutional: She is oriented to person, place, and time and well-developed, well-nourished, and in no distress.  Non-toxic appearance.   HENT:   Head: Normocephalic and atraumatic.   Mouth/Throat: Uvula is midline, oropharynx is clear and moist and mucous membranes are normal. No posterior oropharyngeal edema or posterior oropharyngeal erythema.   Eyes: Pupils are equal, round, and reactive to light. Conjunctivae are normal.   Cardiovascular: Normal rate, regular rhythm and intact distal pulses.   Pulmonary/Chest: Effort normal and breath sounds normal.   Abdominal: Soft. Bowel sounds are normal. There is tenderness in the right lower quadrant. There is CVA  tenderness (Left). There is no rigidity and no rebound.   Musculoskeletal: Normal range of motion. She exhibits no edema.   Neurological: She is alert and oriented to person, place, and time. GCS score is 15.   Skin: Skin is warm and dry.   Psychiatric: Mood, memory, affect and judgment normal.         LAB RESULTS  Results for orders placed or performed during the hospital encounter of 03/14/20   Comprehensive Metabolic Panel   Result Value Ref Range    Glucose 99 65 - 99 mg/dL    BUN 7 6 - 20 mg/dL    Creatinine 0.52 (L) 0.57 - 1.00 mg/dL    Sodium 135 (L) 136 - 145 mmol/L    Potassium 3.5 3.5 - 5.2 mmol/L    Chloride 99 98 - 107 mmol/L    CO2 22.7 22.0 - 29.0 mmol/L    Calcium 9.0 8.6 - 10.5 mg/dL    Total Protein 7.8 6.0 - 8.5 g/dL    Albumin 4.10 3.50 - 5.20 g/dL    ALT (SGPT) 11 1 - 33 U/L    AST (SGOT) 14 1 - 32 U/L    Alkaline Phosphatase 70 39 - 117 U/L    Total Bilirubin 0.3 0.2 - 1.2 mg/dL    eGFR Non African Amer 140 >60 mL/min/1.73    Globulin 3.7 gm/dL    A/G Ratio 1.1 g/dL    BUN/Creatinine Ratio 13.5 7.0 - 25.0    Anion Gap 13.3 5.0 - 15.0 mmol/L   Urinalysis With Microscopic If Indicated (No Culture) - Urine, Clean Catch   Result Value Ref Range    Color, UA Yellow Yellow, Straw    Appearance, UA Clear Clear    pH, UA 6.0 4.5 - 8.0    Specific Gravity, UA 1.025 1.003 - 1.030    Glucose, UA Negative Negative    Ketones, UA Negative Negative    Bilirubin, UA Negative Negative    Blood, UA Negative Negative    Protein, UA 30 mg/dL (1+) (A) Negative    Leuk Esterase, UA Negative Negative    Nitrite, UA Negative Negative    Urobilinogen, UA 0.2 E.U./dL 0.2 - 1.0 E.U./dL   Pregnancy, Urine - Urine, Clean Catch   Result Value Ref Range    HCG, Urine QL Negative Negative   CBC Auto Differential   Result Value Ref Range    WBC 8.16 3.40 - 10.80 10*3/mm3    RBC 5.32 (H) 3.77 - 5.28 10*6/mm3    Hemoglobin 14.1 12.0 - 15.9 g/dL    Hematocrit 43.9 34.0 - 46.6 %    MCV 82.5 79.0 - 97.0 fL    MCH 26.5 (L) 26.6 -  33.0 pg    MCHC 32.1 31.5 - 35.7 g/dL    RDW 12.7 12.3 - 15.4 %    RDW-SD 38.7 37.0 - 54.0 fl    MPV 8.1 6.0 - 12.0 fL    Platelets 412 140 - 450 10*3/mm3    Neutrophil % 70.5 42.7 - 76.0 %    Lymphocyte % 25.5 19.6 - 45.3 %    Monocyte % 2.8 (L) 5.0 - 12.0 %    Eosinophil % 0.9 0.3 - 6.2 %    Basophil % 0.2 0.0 - 1.5 %    Immature Grans % 0.1 0.0 - 0.5 %    Neutrophils, Absolute 5.75 1.70 - 7.00 10*3/mm3    Lymphocytes, Absolute 2.08 0.70 - 3.10 10*3/mm3    Monocytes, Absolute 0.23 0.10 - 0.90 10*3/mm3    Eosinophils, Absolute 0.07 0.00 - 0.40 10*3/mm3    Basophils, Absolute 0.02 0.00 - 0.20 10*3/mm3    Immature Grans, Absolute 0.01 0.00 - 0.05 10*3/mm3   Urinalysis, Microscopic Only - Urine, Clean Catch   Result Value Ref Range    RBC, UA 0-2 (A) None Seen /HPF    WBC, UA None Seen None Seen /HPF    Bacteria, UA None Seen None Seen /HPF    Squamous Epithelial Cells, UA 3-6 (A) None Seen, 0-2 /HPF    Hyaline Casts, UA None Seen None Seen /LPF    Methodology Manual Light Microscopy          I ordered the above labs and reviewed the results    RADIOLOGY  Ct Abdomen Pelvis Without Contrast    Result Date: 3/1/2020  Narrative: CT Abdomen Pelvis WO INDICATION: Left flank pain with hematuria 2/28/2020 TECHNIQUE: CT of the abdomen and pelvis without IV contrast. Coronal and sagittal reconstructions were obtained.  Radiation dose reduction techniques included automated exposure control or exposure modulation based on body size. Count of known CT and cardiac nuc med studies performed in previous 12 months: 0. COMPARISON: 10/14/2018 FINDINGS: Abdomen: Lung bases are clear. Liver, gallbladder, spleen, pancreas, adrenal glands and kidneys are normal except for a 1 mm nonobstructing left renal stone. The aorta is normal in size. No adenopathy. Bowel is normal. Pelvis: Uterus and adnexal regions and bladder are normal except for possible 17 mm left ovarian cyst. The bladder is normal. Bones are unremarkable.     Impression: 1 mm  "nonobstructing left renal stone There may be a 17 mm cyst in the left ovary. Otherwise study is normal Signer Name: Philip Lomeli MD  Signed: 2/29/2020 11:55 PM  Workstation Name: [email or web address] within [facility]  [facility]      I ordered the above radiologic testing and reviewed the results    PROCEDURES  Procedures      PROGRESS AND CONSULTS  ED Course as of Mar 14 1444   Sat Mar 14, 2020   1324 On physical exam, patient found to have tenderness in right lower quadrant, she states this pain is mild in comparison to her left flank pain, feels more like a throbbing sensation and has been present for the past 2 days.  She states she has not really noticed it until I palpated her abdomen.    [KS]   1427 I have just discussed lab and imaging findings with patient and her father.  She states her pain and nausea have improved after Toradol and Zofran.  I discussed with patient that we are able to perform CT with IV contrast to evaluate for other causes of her pain, however it has only been 2 weeks since her last CT scan. I discussed pros and cons of excess radiation, and state that given her physical exam and lack of changes in lab work such as leukocytosis, I do not feel that a CT is indicated at this time.  Patient then became very tearful and agitated stating \" I do not know why the fuck I even came here then, if you are going to fucking do anything.\"  She asked to have a moment alone with her father, I stated I would return to discuss further options with them.    [KS]   1444 Patient again expresses her frustration, and wants to know why is she is dealing with this pain.  I have offered to perform CT with IV contrast to look for other possible causes, however she declines this offer.  She states she is too busy to follow-up with her primary care doctor or urology because of her busy work schedule.  I apologize for her frustrations, I asked if there is anything that I can do for her today, and she states she would " "like \"to just go home.\"  Patient will be discharged in stable condition in care of her father, I discussed that she may always return to the ED should she have any new or worsening symptoms.  Patient verbalizes understanding and is agreeable with this plan.    [KS]      ED Course User Index  [KS] Shelbi Milner PA-C           MEDICAL DECISION MAKING  Results were reviewed/discussed with the patient and they were also made aware of online access. Pt also made aware that some labs, such as cultures, will not be resulted during ER visit and follow up with PMD is necessary.     MDM       My differential diagnosis for abdominal pain includes but is not limited to:  Gastritis, gastroenteritis, peptic ulcer disease, GERD, esophageal perforation, acute appendicitis, mesenteric adenitis, Meckel’s diverticulum, epiploic appendagitis, diverticulitis, colon cancer, ulcerative colitis, Crohn’s disease, intussusception, small bowel obstruction, adhesions, ischemic bowel, perforated viscus, ileus, obstipation, biliary colic, cholecystitis, cholelithiasis, Marty-Gary Brice, hepatitis, pancreatitis, common bile duct obstruction, cholangitis, bile leak, splenic trauma, splenic rupture, splenic infarction, splenic abscess, abdominal abscess, ascites, spontaneous bacterial peritonitis, hernia, UTI, cystitis,ureterolithiasis, urinary obstruction, ovarian cyst, torsion, pregnancy, ectopic pregnancy, PID, pelvic abscess, mittelschmerz, endometriosis, AAA, myocardial infarction, pneumonia, cancer, porphyria, DKA, medications, sickle cell, viral syndrome, zoster      DIAGNOSIS  Final diagnoses:   Left flank pain   Non-intractable vomiting with nausea, unspecified vomiting type       Latest Documented Vital Signs:  As of 14:44  BP- (!) 167/112 HR- 110 Temp- 98 °F (36.7 °C) O2 sat- 100%    DISPOSITION  Patient discharged home in care of her father with instructions to follow-up with PCP and urology.    Discussed pertinent labs with the " patient/family. Patient/Family voiced understanding of need to follow-up for recheck, further testing as needed.  Return to the emergency Department warnings were given.         Medication List      No changes were made to your prescriptions during this visit.           Follow-up Information     Hansa Daniels MD. Call in 2 days.    Specialty:  Family Medicine  Why:  To schedule follow-up appointment  Contact information:  [facility] [facility] [address] [phone]                     Dictated utilizing Dragon dictation       Shelbi Milner PA-C  03/14/20 1441

## 2021-11-29 ENCOUNTER — APPOINTMENT (OUTPATIENT)
Dept: CT IMAGING | Facility: HOSPITAL | Age: 31
End: 2021-11-29

## 2021-11-29 ENCOUNTER — APPOINTMENT (OUTPATIENT)
Dept: ULTRASOUND IMAGING | Facility: HOSPITAL | Age: 31
End: 2021-11-29

## 2021-11-29 ENCOUNTER — HOSPITAL ENCOUNTER (EMERGENCY)
Facility: HOSPITAL | Age: 31
Discharge: HOME | End: 2021-11-29
Attending: EMERGENCY MEDICINE | Admitting: EMERGENCY MEDICINE

## 2021-11-29 VITALS
TEMPERATURE: 97.8 F | OXYGEN SATURATION: 97 % | BODY MASS INDEX: 48.58 KG/M2 | HEART RATE: 110 BPM | WEIGHT: 264 LBS | RESPIRATION RATE: 20 BRPM | DIASTOLIC BLOOD PRESSURE: 89 MMHG | SYSTOLIC BLOOD PRESSURE: 165 MMHG | HEIGHT: 62 IN

## 2021-11-29 DIAGNOSIS — R10.30 LOWER ABDOMINAL PAIN: Primary | ICD-10-CM

## 2021-11-29 LAB
ALBUMIN SERPL-MCNC: 4.6 G/DL (ref 3.5–5.2)
ALBUMIN/GLOB SERPL: 1.5 G/DL
ALP SERPL-CCNC: 107 U/L (ref 39–117)
ALT SERPL W P-5'-P-CCNC: 28 U/L (ref 1–33)
ANION GAP SERPL CALCULATED.3IONS-SCNC: 13.1 MMOL/L (ref 5–15)
AST SERPL-CCNC: 21 U/L (ref 1–32)
BACTERIA UR QL AUTO: ABNORMAL /HPF
BASOPHILS # BLD AUTO: 0.02 10*3/MM3 (ref 0–0.2)
BASOPHILS NFR BLD AUTO: 0.2 % (ref 0–1.5)
BILIRUB SERPL-MCNC: 0.3 MG/DL (ref 0–1.2)
BILIRUB UR QL STRIP: NEGATIVE
BUN SERPL-MCNC: 6 MG/DL (ref 6–20)
BUN/CREAT SERPL: 9 (ref 7–25)
CALCIUM SPEC-SCNC: 9 MG/DL (ref 8.6–10.5)
CHLORIDE SERPL-SCNC: 100 MMOL/L (ref 98–107)
CLARITY UR: CLEAR
CO2 SERPL-SCNC: 24.9 MMOL/L (ref 22–29)
COLOR UR: YELLOW
CREAT SERPL-MCNC: 0.67 MG/DL (ref 0.57–1)
DEPRECATED RDW RBC AUTO: 37.2 FL (ref 37–54)
EOSINOPHIL # BLD AUTO: 0.08 10*3/MM3 (ref 0–0.4)
EOSINOPHIL NFR BLD AUTO: 0.7 % (ref 0.3–6.2)
ERYTHROCYTE [DISTWIDTH] IN BLOOD BY AUTOMATED COUNT: 12.8 % (ref 12.3–15.4)
GFR SERPL CREATININE-BSD FRML MDRD: 103 ML/MIN/1.73
GLOBULIN UR ELPH-MCNC: 3.1 GM/DL
GLUCOSE SERPL-MCNC: 124 MG/DL (ref 65–99)
GLUCOSE UR STRIP-MCNC: NEGATIVE MG/DL
HCG SERPL QL: NEGATIVE
HCT VFR BLD AUTO: 43.6 % (ref 34–46.6)
HGB BLD-MCNC: 14.1 G/DL (ref 12–15.9)
HGB UR QL STRIP.AUTO: ABNORMAL
HOLD SPECIMEN: NORMAL
HYALINE CASTS UR QL AUTO: ABNORMAL /LPF
IMM GRANULOCYTES # BLD AUTO: 0.02 10*3/MM3 (ref 0–0.05)
IMM GRANULOCYTES NFR BLD AUTO: 0.2 % (ref 0–0.5)
KETONES UR QL STRIP: ABNORMAL
LEUKOCYTE ESTERASE UR QL STRIP.AUTO: NEGATIVE
LIPASE SERPL-CCNC: 47 U/L (ref 13–60)
LYMPHOCYTES # BLD AUTO: 2.45 10*3/MM3 (ref 0.7–3.1)
LYMPHOCYTES NFR BLD AUTO: 22.8 % (ref 19.6–45.3)
MCH RBC QN AUTO: 26.2 PG (ref 26.6–33)
MCHC RBC AUTO-ENTMCNC: 32.3 G/DL (ref 31.5–35.7)
MCV RBC AUTO: 80.9 FL (ref 79–97)
MONOCYTES # BLD AUTO: 0.3 10*3/MM3 (ref 0.1–0.9)
MONOCYTES NFR BLD AUTO: 2.8 % (ref 5–12)
NEUTROPHILS NFR BLD AUTO: 7.86 10*3/MM3 (ref 1.7–7)
NEUTROPHILS NFR BLD AUTO: 73.3 % (ref 42.7–76)
NITRITE UR QL STRIP: NEGATIVE
NRBC BLD AUTO-RTO: 0 /100 WBC (ref 0–0.2)
PH UR STRIP.AUTO: 6 [PH] (ref 4.5–8)
PLATELET # BLD AUTO: 463 10*3/MM3 (ref 140–450)
PMV BLD AUTO: 8.2 FL (ref 6–12)
POTASSIUM SERPL-SCNC: 3.5 MMOL/L (ref 3.5–5.2)
PROT SERPL-MCNC: 7.7 G/DL (ref 6–8.5)
PROT UR QL STRIP: ABNORMAL
RBC # BLD AUTO: 5.39 10*6/MM3 (ref 3.77–5.28)
RBC # UR STRIP: ABNORMAL /HPF
REF LAB TEST METHOD: ABNORMAL
SODIUM SERPL-SCNC: 138 MMOL/L (ref 136–145)
SP GR UR STRIP: 1.02 (ref 1–1.03)
SQUAMOUS #/AREA URNS HPF: ABNORMAL /HPF
UROBILINOGEN UR QL STRIP: ABNORMAL
WBC # UR STRIP: ABNORMAL /HPF
WBC NRBC COR # BLD: 10.73 10*3/MM3 (ref 3.4–10.8)
WHOLE BLOOD HOLD SPECIMEN: NORMAL
WHOLE BLOOD HOLD SPECIMEN: NORMAL

## 2021-11-29 PROCEDURE — 0 IOPAMIDOL PER 1 ML: Performed by: EMERGENCY MEDICINE

## 2021-11-29 PROCEDURE — 74177 CT ABD & PELVIS W/CONTRAST: CPT

## 2021-11-29 PROCEDURE — 99283 EMERGENCY DEPT VISIT LOW MDM: CPT

## 2021-11-29 PROCEDURE — 99283 EMERGENCY DEPT VISIT LOW MDM: CPT | Performed by: EMERGENCY MEDICINE

## 2021-11-29 PROCEDURE — 81001 URINALYSIS AUTO W/SCOPE: CPT | Performed by: EMERGENCY MEDICINE

## 2021-11-29 PROCEDURE — 76856 US EXAM PELVIC COMPLETE: CPT

## 2021-11-29 PROCEDURE — 76830 TRANSVAGINAL US NON-OB: CPT

## 2021-11-29 PROCEDURE — 83690 ASSAY OF LIPASE: CPT

## 2021-11-29 PROCEDURE — 80053 COMPREHEN METABOLIC PANEL: CPT

## 2021-11-29 PROCEDURE — 84703 CHORIONIC GONADOTROPIN ASSAY: CPT

## 2021-11-29 PROCEDURE — 85025 COMPLETE CBC W/AUTO DIFF WBC: CPT

## 2021-11-29 RX ORDER — SODIUM CHLORIDE 0.9 % (FLUSH) 0.9 %
10 SYRINGE (ML) INJECTION AS NEEDED
Status: DISCONTINUED | OUTPATIENT
Start: 2021-11-29 | End: 2021-11-30 | Stop reason: HOSPADM

## 2021-11-29 RX ORDER — DICYCLOMINE HYDROCHLORIDE 10 MG/1
20 CAPSULE ORAL ONCE
Status: COMPLETED | OUTPATIENT
Start: 2021-11-29 | End: 2021-11-29

## 2021-11-29 RX ORDER — SERTRALINE HYDROCHLORIDE 100 MG/1
150 TABLET, FILM COATED ORAL DAILY
COMMUNITY
Start: 2021-08-24

## 2021-11-29 RX ORDER — HYDROCODONE BITARTRATE AND ACETAMINOPHEN 5; 325 MG/1; MG/1
1 TABLET ORAL ONCE
Status: COMPLETED | OUTPATIENT
Start: 2021-11-29 | End: 2021-11-29

## 2021-11-29 RX ORDER — DICYCLOMINE HYDROCHLORIDE 10 MG/ML
20 INJECTION INTRAMUSCULAR ONCE
Status: DISCONTINUED | OUTPATIENT
Start: 2021-11-29 | End: 2021-11-30 | Stop reason: HOSPADM

## 2021-11-29 RX ORDER — ACETAMINOPHEN 500 MG
1000 TABLET ORAL ONCE
Status: COMPLETED | OUTPATIENT
Start: 2021-11-29 | End: 2021-11-29

## 2021-11-29 RX ORDER — HYDROCODONE BITARTRATE AND ACETAMINOPHEN 5; 325 MG/1; MG/1
1 TABLET ORAL EVERY 6 HOURS PRN
Qty: 20 TABLET | Refills: 0 | Status: SHIPPED | OUTPATIENT
Start: 2021-11-29

## 2021-11-29 RX ADMIN — IOPAMIDOL 100 ML: 755 INJECTION, SOLUTION INTRAVENOUS at 21:03

## 2021-11-29 RX ADMIN — DICYCLOMINE HYDROCHLORIDE 20 MG: 10 CAPSULE ORAL at 19:07

## 2021-11-29 RX ADMIN — HYDROCODONE BITARTRATE AND ACETAMINOPHEN 1 TABLET: 5; 325 TABLET ORAL at 22:32

## 2021-11-29 RX ADMIN — ACETAMINOPHEN 1000 MG: 500 TABLET, FILM COATED ORAL at 20:52

## 2022-06-09 ENCOUNTER — HOSPITAL ENCOUNTER (EMERGENCY)
Facility: HOSPITAL | Age: 32
Discharge: HOME | End: 2022-06-09
Attending: EMERGENCY MEDICINE | Admitting: EMERGENCY MEDICINE

## 2022-06-09 VITALS
BODY MASS INDEX: 53.37 KG/M2 | OXYGEN SATURATION: 96 % | SYSTOLIC BLOOD PRESSURE: 148 MMHG | DIASTOLIC BLOOD PRESSURE: 90 MMHG | HEIGHT: 62 IN | TEMPERATURE: 99.4 F | HEART RATE: 101 BPM | WEIGHT: 290 LBS | RESPIRATION RATE: 18 BRPM

## 2022-06-09 DIAGNOSIS — G43.109 MIGRAINE WITH AURA AND WITHOUT STATUS MIGRAINOSUS, NOT INTRACTABLE: Primary | ICD-10-CM

## 2022-06-09 PROCEDURE — 99283 EMERGENCY DEPT VISIT LOW MDM: CPT

## 2022-06-09 PROCEDURE — 99282 EMERGENCY DEPT VISIT SF MDM: CPT | Performed by: EMERGENCY MEDICINE

## 2022-06-09 PROCEDURE — 63710000001 ONDANSETRON ODT 4 MG TABLET DISPERSIBLE: Performed by: EMERGENCY MEDICINE

## 2022-06-09 PROCEDURE — 96372 THER/PROPH/DIAG INJ SC/IM: CPT

## 2022-06-09 PROCEDURE — 25010000002 KETOROLAC TROMETHAMINE PER 15 MG: Performed by: EMERGENCY MEDICINE

## 2022-06-09 RX ORDER — ONDANSETRON 4 MG/1
8 TABLET, ORALLY DISINTEGRATING ORAL ONCE
Status: COMPLETED | OUTPATIENT
Start: 2022-06-09 | End: 2022-06-09

## 2022-06-09 RX ORDER — KETOROLAC TROMETHAMINE 30 MG/ML
60 INJECTION, SOLUTION INTRAMUSCULAR; INTRAVENOUS ONCE
Status: COMPLETED | OUTPATIENT
Start: 2022-06-09 | End: 2022-06-09

## 2022-06-09 RX ORDER — PROMETHAZINE HYDROCHLORIDE 25 MG/1
25 TABLET ORAL EVERY 6 HOURS PRN
Qty: 20 TABLET | Refills: 0 | Status: SHIPPED | OUTPATIENT
Start: 2022-06-09

## 2022-06-09 RX ADMIN — ONDANSETRON 8 MG: 4 TABLET, ORALLY DISINTEGRATING ORAL at 19:39

## 2022-06-09 RX ADMIN — KETOROLAC TROMETHAMINE 60 MG: 30 INJECTION, SOLUTION INTRAMUSCULAR; INTRAVENOUS at 19:40

## 2022-06-09 NOTE — ED PROVIDER NOTES
Subjective   Carolina Mccarty is a 32-year-old white female who present secondary to migraine headache.  Onset of headache 2 days ago.  The headache began behind her left eye and radiates through the left side of the head.  Associated visual aura, photophobia, phonophobia, nausea and dizziness.  Patient has taken multiple medications for this migraine including Imitrex And Excedrin.  No relief of symptoms.  Thus patient elected to come to the ED tonight for evaluation.    Patient has had similar migraines in the past.  However she has not had one that lasted 3 days in the recent past.  Patient is under the care of a neurologist for her migraine headaches.      History provided by:  Patient      Review of Systems   Constitutional: Negative.  Negative for fever.   HENT: Negative for rhinorrhea.    Eyes: Positive for photophobia (With migraine headache as above). Negative for redness.   Respiratory: Negative for cough.    Cardiovascular: Negative for chest pain.   Gastrointestinal: Positive for nausea (With migraine headache as above). Negative for abdominal pain.   Endocrine: Negative.    Genitourinary: Negative.  Negative for difficulty urinating.   Musculoskeletal: Negative.  Negative for back pain.   Skin: Negative.  Negative for color change.   Neurological: Positive for dizziness (With migraine headache as above) and headaches. Negative for syncope.   Hematological: Negative.    Psychiatric/Behavioral: Negative.    All other systems reviewed and are negative.      Past Medical History:   Diagnosis Date   • ASCUS of cervix with negative high risk HPV 2014   • Atypical squamous cells of undetermined significance (ASCUS) on Papanicolaou smear of cervix 2012    NO HPV DONE   • Bipolar depression (HCC)    • Cervical high risk HPV (human papillomavirus) test positive 2015    HPV 16   • Kidney stones    • LGSIL on Pap smear of cervix 2011   • Migraine    • SAB (spontaneous )    •  "SAB (spontaneous ) 2007       Allergies   Allergen Reactions   • Amoxicillin Hives   • Lamictal [Lamotrigine] Hives     Also states that she \"cannot walk.\"       Past Surgical History:   Procedure Laterality Date   •  SECTION      x 2    • COLPOSCOPY  2011    NO PATH SENT   • COLPOSCOPY W/ BIOPSY / CURETTAGE  2014    LGSIL   • COLPOSCOPY W/ BIOPSY / CURETTAGE  2012    CHRONIC CERVICITIS AND SQUAMOUS METAPLASIA   • COLPOSCOPY W/ BIOPSY / CURETTAGE  2015    NEGATIVE   • TONSILLECTOMY         Family History   Problem Relation Age of Onset   • Atrial fibrillation Father        Social History     Socioeconomic History   • Marital status: Single   Tobacco Use   • Smoking status: Former Smoker     Packs/day: 0.50     Years: 10.00     Pack years: 5.00     Types: Electronic Cigarette   • Smokeless tobacco: Never Used   • Tobacco comment: cigarettes and e-cigarettes    Substance and Sexual Activity   • Alcohol use: No     Comment: rare   • Drug use: Not Currently     Types: Marijuana     Comment: previous cbd oil   • Sexual activity: Yes     Birth control/protection: Implant     Comment: NEXPLANON           Objective   Physical Exam  Vitals and nursing note reviewed.   Constitutional:       General: She is not in acute distress.     Appearance: Normal appearance. She is well-developed. She is not ill-appearing, toxic-appearing or diaphoretic.      Comments: 32-year-old white female laying in bed.  Patient is morbidly obese.  She otherwise appears in good health.  Vital signs notable for heart rate of 101 and blood pressure 148/90.  Overhead lights are off as patient is sensitive to the lights.  She is friendly and cooperative.   HENT:      Head: Normocephalic and atraumatic.      Right Ear: Tympanic membrane, ear canal and external ear normal.      Left Ear: Tympanic membrane, ear canal and external ear normal.      Nose: Nose normal.      Mouth/Throat:      Mouth: Mucous membranes are " moist.      Pharynx: Oropharynx is clear.   Eyes:      General: Lids are normal.      Extraocular Movements: Extraocular movements intact.      Conjunctiva/sclera: Conjunctivae normal.      Pupils: Pupils are equal, round, and reactive to light.     Cardiovascular:      Rate and Rhythm: Normal rate and regular rhythm.      Pulses: Normal pulses.      Heart sounds: Normal heart sounds. No murmur heard.    No friction rub. No gallop.   Pulmonary:      Effort: Pulmonary effort is normal.      Breath sounds: Normal breath sounds.   Abdominal:      General: Bowel sounds are normal. There is no distension.      Palpations: Abdomen is soft. There is no mass.      Tenderness: There is no abdominal tenderness. There is no guarding or rebound.      Hernia: No hernia is present.   Musculoskeletal:         General: Normal range of motion.      Cervical back: Normal range of motion and neck supple.   Skin:     General: Skin is warm and dry.      Capillary Refill: Capillary refill takes less than 2 seconds.   Neurological:      General: No focal deficit present.      Mental Status: She is alert and oriented to person, place, and time.      Deep Tendon Reflexes: Reflexes are normal and symmetric.   Psychiatric:         Mood and Affect: Mood normal.         Behavior: Behavior normal.         Procedures           ED Course  ED Course as of 06/09/22 2352   Thu Jun 09, [id number], 2022 1923 Patient drove her self to the ED.  She does not have any other way home.  Thus I cannot give patient a migraine cocktail that contains any sedating medications.  I have explained this to the patient.  She understands.  Giving Toradol 60 mg IM and Zofran 8 mg ODT. [SS]      ED Course User Index  [SS] Melvin Pendleton MD      My differential diagnosis for headache includes but is not limited to:  Migraine, cluster, ischemic stroke, subarachnoid hemorrhage, intracranial hemorrhage, vascular malformation, cerebral aneurysm, vascular dissection, vasculitis,  temporal arteritis, malignant hypertension, pheochromocytoma, cerebral venous thrombosis, preeclampsia; bacterial meningitis, viral meningitis, fungal meningitis, encephalitis, brain abscess, pleural empyema, sinusitis, dental infection, influenza, viral syndrome; carbon monoxide exposure, analgesic abuse, hypoglycemia; trigeminal neuralgia, postherpetic neuralgia, occipital neuralgia; subdural hematoma, concussion, musculoskeletal tension, cervical osteoarthritis; glaucoma, TMJ disease, pseudotumor cerebri, post LP headache, intracranial neoplasm, sleep apnea                                             MDM    Final diagnoses:   Migraine with aura and without status migrainosus, not intractable       ED Disposition  ED Disposition     ED Disposition   Discharge    Condition   Stable    Comment   --             Hansa Daniels MD  [address] [id number] [phone]    Schedule an appointment as soon as possible for a visit in 3 days  for continued or worsened symptoms, Sooner if needed    Your neurologist    Call   Tomorrow.  Inform them of protracted migraine headache, follow-up per the recommendation.         Medication List      New Prescriptions    promethazine 25 MG tablet  Commonly known as: PHENERGAN  Take 1 tablet by mouth Every 6 (Six) Hours As Needed for Nausea or Vomiting for up to 20 doses.           Where to Get Your Medications      These medications were sent to [facility] [address] [place] [address] [address] [facility] [address] - [phone] [facility] [phone]   [address] [address]    Phone: [phone]   · promethazine 25 MG tablet          Melvin Pendleton MD  06/09/22 2421

## 2023-02-24 ENCOUNTER — TELEPHONE (OUTPATIENT)
Dept: FAMILY MEDICINE CLINIC | Facility: CLINIC | Age: 33
End: 2023-02-24

## 2023-02-24 ENCOUNTER — TELEPHONE (OUTPATIENT)
Dept: FAMILY MEDICINE CLINIC | Facility: CLINIC | Age: 33
End: 2023-02-24
Payer: MEDICAID

## 2023-02-24 RX ORDER — NIFEDIPINE 30 MG/1
30 TABLET, EXTENDED RELEASE ORAL DAILY
Qty: 30 TABLET | Refills: 2 | Status: SHIPPED | OUTPATIENT
Start: 2023-02-24 | End: 2023-05-09

## 2023-02-24 NOTE — TELEPHONE ENCOUNTER
I called patient and she said her highest BP was 157/112 and that was on 02/09. Her recent BP from a couple days ago was 127/88. Today she couldn't remember the systolic number, but the diastolic was 93. I made an appt for her on 05/09/2023 and added her to the wait list. Can she be perscribed a BP med for the moment or does she need to go somewhere else?

## 2023-02-24 NOTE — TELEPHONE ENCOUNTER
Caller: Carolina Frias    Relationship: Self    Best call back number: [phone]    What medication are you requesting: BLOOD PRESSURE MEDICATION    What are your current symptoms: ELEVATED BP. PATIENT IS SUPPOSE TO HAVE A GYNECOLOGICAL SURGERY ON 3/9/23. THEY WILL NOT DO THIS SURGERY IF HER BP IS ELEVATED.     How long have you been experiencing symptoms: AWHILE BUT HAS NOT HAD TO DO ANYTHING FOR IT UNTIL NOW     Have you had these symptoms before:    [x] Yes  [] No    Have you been treated for these symptoms before:   [x] Yes  [] No    If a prescription is needed, what is your preferred pharmacy and phone number: [id number] [place] [place] [place] [facility] [address] [facility] [address] - [phone]  - [phone] FX     Additional notes: PATIENT STATES SHE WAS ON HYDROCHLOROTHIAZIDE PREVIOUSLY AND DID NOT DO WELL ON IT. NO OFFICE VISITS AVAILABLE BEFORE SURGERY.

## 2023-02-24 NOTE — TELEPHONE ENCOUNTER
PATIENT IS CALLING TO CHECK THE STATUS OF THIS REQUEST.     PATIENT IS REQUESTING A CALL BACK ASAP.

## 2023-02-25 NOTE — TELEPHONE ENCOUNTER
I sent in Procardia XL 30mg daily to get that number down.  We may need a higher dose down the road but this will get things started.

## 2023-04-05 ENCOUNTER — HOSPITAL ENCOUNTER (EMERGENCY)
Facility: HOSPITAL | Age: 33
Discharge: HOME | End: 2023-04-05
Attending: EMERGENCY MEDICINE | Admitting: EMERGENCY MEDICINE
Payer: MEDICAID

## 2023-04-05 VITALS
HEIGHT: 62 IN | WEIGHT: 280 LBS | TEMPERATURE: 97.9 F | SYSTOLIC BLOOD PRESSURE: 155 MMHG | DIASTOLIC BLOOD PRESSURE: 104 MMHG | BODY MASS INDEX: 51.53 KG/M2 | OXYGEN SATURATION: 98 % | HEART RATE: 100 BPM | RESPIRATION RATE: 18 BRPM
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
## 2023-04-05 DIAGNOSIS — T78.40XA ALLERGIC REACTION, INITIAL ENCOUNTER: ICD-10-CM

## 2023-04-05 PROCEDURE — 99283 EMERGENCY DEPT VISIT LOW MDM: CPT

## 2023-04-05 PROCEDURE — 63710000001 PREDNISONE PER 1 MG: Performed by: EMERGENCY MEDICINE

## 2023-04-05 RX ORDER — PREDNISONE 20 MG/1
60 TABLET ORAL ONCE
Status: COMPLETED | OUTPATIENT
Start: 2023-04-05 | End: 2023-04-05

## 2023-04-05 RX ORDER — METHYLPREDNISOLONE 4 MG/1
TABLET ORAL
Qty: 21 TABLET | Refills: 0 | Status: SHIPPED | OUTPATIENT
Start: 2023-04-05

## 2023-04-05 RX ADMIN — PREDNISONE 60 MG: 20 TABLET ORAL at 07:24

## 2023-04-05 NOTE — ED PROVIDER NOTES
Subjective   History of Present Illness  History of Present Illness    Chief complaint: Rash    Location: Diffuse    Quality/Severity: Itches    Timing/Onset/Duration: Started yesterday    Modifying Factors: Minimal to no improvement with    Associated Symptoms: No shortness of breath.  No nausea or vomiting or diarrhea.    Narrative: This 32-year-old white female, just started using a new body wash, and presents with rash.  Patient took Benadryl and did not get any relief.  She complains that her hands are swollen this morning.  They wash their dogs yesterday.  No other new lotions, perfumes, soaps, or shampoos or detergents.  No new or unusual foods    PCP:Hansa Daniels MD  Review of Systems   Constitutional: Negative for chills and fever.   HENT: Negative for trouble swallowing.    Respiratory: Negative for shortness of breath.         Medication List      ASK your doctor about these medications    HYDROcodone-acetaminophen 5-325 MG per tablet  Commonly known as: NORCO  Take 1 tablet by mouth Every 6 (Six) Hours As Needed for Moderate Pain .     NIFEdipine XL 30 MG 24 hr tablet  Commonly known as: Procardia XL  Take 1 tablet by mouth Daily.     promethazine 25 MG tablet  Commonly known as: PHENERGAN  Take 1 tablet by mouth Every 6 (Six) Hours As Needed for Nausea or Vomiting for up to 20 doses.     sertraline 100 MG tablet  Commonly known as: ZOLOFT     SUMAtriptan 50 MG tablet  Commonly known as: IMITREX  Take 1 tablet by mouth Every 2 (Two) Hours As Needed for Migraine. May repeat dose one time in 2 hours if headache not relieved.     XULANE TD            Past Medical History:   Diagnosis Date   • ASCUS of cervix with negative high risk HPV 07/28/2014   • Atypical squamous cells of undetermined significance (ASCUS) on Papanicolaou smear of cervix 01/20/2012    NO HPV DONE   • Bipolar depression    • Cervical high risk HPV (human papillomavirus) test positive 02/17/2015    HPV 16   • Kidney stones   "  • LGSIL on Pap smear of cervix 2011   • Migraine    • SAB (spontaneous )    • SAB (spontaneous )        Allergies   Allergen Reactions   • Amoxicillin Hives   • Lamictal [Lamotrigine] Hives     Also states that she \"cannot walk.\"       Past Surgical History:   Procedure Laterality Date   •  SECTION      x 2    • COLPOSCOPY  2011    NO PATH SENT   • COLPOSCOPY W/ BIOPSY / CURETTAGE  2014    LGSIL   • COLPOSCOPY W/ BIOPSY / CURETTAGE  2012    CHRONIC CERVICITIS AND SQUAMOUS METAPLASIA   • COLPOSCOPY W/ BIOPSY / CURETTAGE  2015    NEGATIVE   • TONSILLECTOMY         Family History   Problem Relation Age of Onset   • Atrial fibrillation Father        Social History     Socioeconomic History   • Marital status: Single   Tobacco Use   • Smoking status: Former     Packs/day: 0.50     Years: 10.00     Pack years: 5.00     Types: Electronic Cigarette, Cigarettes   • Smokeless tobacco: Never   • Tobacco comments:     cigarettes and e-cigarettes    Vaping Use   • Vaping Use: Some days   Substance and Sexual Activity   • Alcohol use: No     Comment: rare   • Drug use: Yes     Types: Marijuana     Comment: cbd gummies   • Sexual activity: Yes     Birth control/protection: Implant     Comment: NEXPLANON           Objective   Physical Exam  Vitals (The temperature is 97.9 °F, pulse 105, respirations 18, /116, room air pulse ox 98%) and nursing note reviewed.   Constitutional:       Appearance: Normal appearance.   HENT:      Head: Normocephalic and atraumatic.   Cardiovascular:      Rate and Rhythm: Normal rate and regular rhythm.      Pulses: Normal pulses.      Heart sounds: Normal heart sounds. No murmur heard.    No friction rub. No gallop.   Pulmonary:      Effort: Pulmonary effort is normal. No respiratory distress.      Breath sounds: Normal breath sounds. No stridor. No wheezing, rhonchi or rales.   Chest:      Chest wall: No tenderness.   Abdominal:      " General: Abdomen is flat. Bowel sounds are normal. There is no distension.      Palpations: Abdomen is soft. There is no mass.      Tenderness: There is no abdominal tenderness. There is no guarding or rebound.      Hernia: No hernia is present.   Musculoskeletal:         General: No tenderness. Normal range of motion.      Cervical back: Normal range of motion and neck supple.      Comments: The hands are swollen   Skin:     General: Skin is warm and dry.      Capillary Refill: Capillary refill takes less than 2 seconds.      Comments: There are lesions on the trunk that are consistent with bug bites.  There is no sign of cellulitis   Neurological:      General: No focal deficit present.      Mental Status: She is alert and oriented to person, place, and time.         Procedures           ED Course      07:11 EDT, 04/05/23:  The patient's diagnosis of bug bites, possible allergic reaction, was discussed with her.  The patient will be placed on a Medrol Dosepak.  She should take Benadryl as needed as directed for itching.  The patient will be given a prescription for Elimite.  The patient should follow-up with Dr. Ash within 1 week.  She should return to the emergency department there is worsening itching, shortness of breath, difficulty swallowing or speaking, worse in any way at all.  All the patient question were answered she will be discharged in good condition.  Use a hypoallergenic soap.                                     MDM    Final diagnoses:   Bug bite, initial encounter       ED Disposition  ED Disposition     None          No follow-up provider specified.       Medication List      No changes were made to your prescriptions during this visit.          Jeremiah Lange MD  04/05/23 4901

## 2023-04-05 NOTE — Clinical Note
[place]  [facility] EMERGENCY DEPARTMENT  [address]  [place] [address]  Phone: [phone]    Carolina Frias was seen and treated in our emergency department on 4/5/2023.  She may return to work on 04/05/2023.         Thank you for choosing [facility].    Jeremiah Lange MD

## 2023-04-05 NOTE — DISCHARGE INSTRUCTIONS
Take the Medrol Dosepak as prescribed.  Take the permethrin as prescribed.  Follow-up with Dr. Ash next week.  Take Benadryl as needed as directed for itching.  Return to the emergency department if there is increasing shortness of breath, difficulty swallowing or speaking, worse in any way at all

## 2023-05-09 ENCOUNTER — OFFICE VISIT (OUTPATIENT)
Dept: FAMILY MEDICINE CLINIC | Facility: CLINIC | Age: 33
End: 2023-05-09
Payer: MEDICAID

## 2023-05-09 VITALS
DIASTOLIC BLOOD PRESSURE: 124 MMHG | BODY MASS INDEX: 50.5 KG/M2 | HEART RATE: 124 BPM | SYSTOLIC BLOOD PRESSURE: 174 MMHG | WEIGHT: 285 LBS | HEIGHT: 63 IN | TEMPERATURE: 98.4 F | OXYGEN SATURATION: 96 % | RESPIRATION RATE: 18 BRPM

## 2023-05-09 DIAGNOSIS — N93.9 ABNORMAL UTERINE BLEEDING (AUB): ICD-10-CM

## 2023-05-09 DIAGNOSIS — F41.1 GENERALIZED ANXIETY DISORDER: ICD-10-CM

## 2023-05-09 DIAGNOSIS — I10 ESSENTIAL HYPERTENSION: Primary | ICD-10-CM

## 2023-05-09 DIAGNOSIS — R05.1 ACUTE COUGH: ICD-10-CM

## 2023-05-09 PROBLEM — Z30.09 STERILIZATION CONSULT: Status: ACTIVE | Noted: 2021-06-04

## 2023-05-09 PROBLEM — Z15.02 MONOALLELIC MUTATION OF CHEK2 GENE IN FEMALE PATIENT: Status: ACTIVE | Noted: 2022-03-11

## 2023-05-09 PROBLEM — O10.919 HTN IN PREGNANCY, CHRONIC: Status: ACTIVE | Noted: 2021-01-29

## 2023-05-09 PROBLEM — H93.8X3 EAR FULLNESS, BILATERAL: Status: RESOLVED | Noted: 2017-12-08 | Resolved: 2023-05-09

## 2023-05-09 PROBLEM — F17.200 SMOKER: Status: ACTIVE | Noted: 2020-12-16

## 2023-05-09 PROBLEM — G43.709 CHRONIC MIGRAINE WITHOUT AURA WITHOUT STATUS MIGRAINOSUS, NOT INTRACTABLE: Status: ACTIVE | Noted: 2019-09-03

## 2023-05-09 PROBLEM — F41.9 ANXIETY: Status: ACTIVE | Noted: 2023-02-09

## 2023-05-09 PROBLEM — Z15.89 MONOALLELIC MUTATION OF CHEK2 GENE IN FEMALE PATIENT: Status: ACTIVE | Noted: 2022-03-11

## 2023-05-09 PROBLEM — F43.10 PTSD (POST-TRAUMATIC STRESS DISORDER): Status: ACTIVE | Noted: 2023-05-09

## 2023-05-09 PROBLEM — R87.613 HGSIL ON CYTOLOGIC SMEAR OF CERVIX: Status: ACTIVE | Noted: 2020-08-04

## 2023-05-09 PROBLEM — Z98.891 HISTORY OF 2 CESAREAN SECTIONS: Status: ACTIVE | Noted: 2020-12-16

## 2023-05-09 PROBLEM — Z15.01 MONOALLELIC MUTATION OF CHEK2 GENE IN FEMALE PATIENT: Status: ACTIVE | Noted: 2022-03-11

## 2023-05-09 PROBLEM — Z98.890 HISTORY OF LOOP ELECTRICAL EXCISION PROCEDURE (LEEP): Status: ACTIVE | Noted: 2020-12-16

## 2023-05-09 PROBLEM — O10.919 HTN IN PREGNANCY, CHRONIC: Status: RESOLVED | Noted: 2021-01-29 | Resolved: 2023-05-09

## 2023-05-09 PROBLEM — Z15.09 MONOALLELIC MUTATION OF CHEK2 GENE IN FEMALE PATIENT: Status: ACTIVE | Noted: 2022-03-11

## 2023-05-09 RX ORDER — TAMSULOSIN HYDROCHLORIDE 0.4 MG/1
0.4 CAPSULE ORAL
COMMUNITY
Start: 2022-12-21

## 2023-05-09 RX ORDER — IBUPROFEN 800 MG/1
TABLET ORAL
COMMUNITY
Start: 2023-03-09

## 2023-05-09 RX ORDER — ALBUTEROL SULFATE 90 UG/1
2 AEROSOL, METERED RESPIRATORY (INHALATION)
COMMUNITY
Start: 2022-12-08

## 2023-05-09 RX ORDER — NIFEDIPINE 60 MG/1
60 TABLET, EXTENDED RELEASE ORAL DAILY
Qty: 30 TABLET | Refills: 2 | Status: SHIPPED | OUTPATIENT
Start: 2023-05-09

## 2023-05-09 NOTE — PATIENT INSTRUCTIONS
Try Flonase and Mucinex for your drainage and cough.  Continue salt water gargles and sprays.    We bumped up Procardia XL to 60mg to get that pressure down prior to surgery.  Take your medication EVERY day.    We talked about marijuana rules in [place] and I explained that there is no medical marijuana in [place] but if obtained legally in another state and an appropriate diagnosis, that you can have it in [place].

## 2023-05-09 NOTE — PROGRESS NOTES
"Radha Frias is a 32 y.o. female who presents with   Chief Complaint   Patient presents with   • Anxiety   • Elevated Blood Pressure       History of Present Illness     To have a total hysterectomy with Dr. Whelan for chronic pelvic pain and abnormal uterine bleeding.      H/O anxiety, borderline personality disorder and PTSD and h/o addiction so doesn't like to take pills at all.  Goes to [place] for legal marijuana and it helps her. Uses edibles (cuts into slivers) and takes very small amounts as needed. Very anxious about going without for 10 days prior to surgery.    BP has been running high and on Nifedipine ER 30mg but inconsistent with it.     Back in school getting her masters in psychology.     Cough for the past day or two and has surgery on Thursday.  Using Salt water gargles.              Review of Systems     Objective     BP (!) 174/124 (BP Location: Right arm, Patient Position: Sitting, Cuff Size: Large Adult) Comment (BP Location): Forearm  Pulse (!) 124   Temp 98.4 °F (36.9 °C) (Oral)   Resp 18   Ht 158.8 cm (62.5\")   Wt 129 kg (285 lb)   LMP 05/09/2023 (Exact Date)   SpO2 96%   Breastfeeding No   BMI 51.30 kg/m²     Physical Exam  Constitutional:       Appearance: Normal appearance.   Cardiovascular:      Rate and Rhythm: Normal rate and regular rhythm.      Heart sounds: Normal heart sounds.   Pulmonary:      Effort: Pulmonary effort is normal.      Breath sounds: Normal breath sounds.   Lymphadenopathy:      Cervical: No cervical adenopathy.   Neurological:      Mental Status: She is alert.   Psychiatric:         Behavior: Behavior normal.         Procedures     Assessment & Plan   Diagnoses and all orders for this visit:    1. Essential hypertension (Primary)  -     NIFEdipine XL (PROCARDIA XL) 60 MG 24 hr tablet; Take 1 tablet by mouth Daily.  Dispense: 30 tablet; Refill: 2    2. Generalized anxiety disorder  Assessment & Plan:  Self-medicates with marijuana " edibles.      3. Abnormal uterine bleeding (AUB)  Assessment & Plan:  Scheduled for surgery 5/11      4. Acute cough  Comments:  Let your surgeon know about your cough if it does not resolve        Discussion    Patient Instructions   Try Flonase and Mucinex for your drainage and cough.  Continue salt water gargles and sprays.    We bumped up Procardia XL to 60mg to get that pressure down prior to surgery.  Take your medication EVERY day.    We talked about marijuana rules in [place] and I explained that there is no medical marijuana in [place] but if obtained legally in another state and an appropriate diagnosis, that you can have it in [place].                 Hansa Daniels MD

## 2023-08-29 ENCOUNTER — OFFICE VISIT (OUTPATIENT)
Dept: FAMILY MEDICINE CLINIC | Facility: CLINIC | Age: 33
End: 2023-08-29
Payer: MEDICAID

## 2023-08-29 VITALS
WEIGHT: 284.5 LBS | TEMPERATURE: 97.7 F | HEIGHT: 63 IN | SYSTOLIC BLOOD PRESSURE: 127 MMHG | BODY MASS INDEX: 50.41 KG/M2 | DIASTOLIC BLOOD PRESSURE: 84 MMHG | OXYGEN SATURATION: 97 % | HEART RATE: 102 BPM

## 2023-08-29 DIAGNOSIS — I10 ESSENTIAL HYPERTENSION: Primary | ICD-10-CM

## 2023-08-29 DIAGNOSIS — D50.0 ANEMIA DUE TO BLOOD LOSS: ICD-10-CM

## 2023-08-29 DIAGNOSIS — G43.009 MIGRAINE WITHOUT AURA AND WITHOUT STATUS MIGRAINOSUS, NOT INTRACTABLE: ICD-10-CM

## 2023-08-29 DIAGNOSIS — F43.10 PTSD (POST-TRAUMATIC STRESS DISORDER): ICD-10-CM

## 2023-08-29 DIAGNOSIS — N20.0 KIDNEY STONE: ICD-10-CM

## 2023-08-29 PROCEDURE — 3079F DIAST BP 80-89 MM HG: CPT | Performed by: FAMILY MEDICINE

## 2023-08-29 PROCEDURE — 1159F MED LIST DOCD IN RCRD: CPT | Performed by: FAMILY MEDICINE

## 2023-08-29 PROCEDURE — 3074F SYST BP LT 130 MM HG: CPT | Performed by: FAMILY MEDICINE

## 2023-08-29 PROCEDURE — 99214 OFFICE O/P EST MOD 30 MIN: CPT | Performed by: FAMILY MEDICINE

## 2023-08-29 PROCEDURE — 1160F RVW MEDS BY RX/DR IN RCRD: CPT | Performed by: FAMILY MEDICINE

## 2023-08-29 RX ORDER — NIFEDIPINE 60 MG/1
60 TABLET, EXTENDED RELEASE ORAL DAILY
Qty: 30 TABLET | Refills: 2 | Status: SHIPPED | OUTPATIENT
Start: 2023-08-29 | End: 2023-08-29 | Stop reason: SDUPTHER

## 2023-08-29 RX ORDER — NIFEDIPINE 60 MG/1
60 TABLET, EXTENDED RELEASE ORAL DAILY
Qty: 90 TABLET | Refills: 1 | Status: SHIPPED | OUTPATIENT
Start: 2023-08-29

## 2023-08-29 RX ORDER — ONDANSETRON 4 MG/1
4 TABLET, ORALLY DISINTEGRATING ORAL
COMMUNITY
Start: 2023-05-24

## 2023-08-29 RX ORDER — RIZATRIPTAN BENZOATE 10 MG/1
10 TABLET ORAL ONCE AS NEEDED
Qty: 9 TABLET | Refills: 5 | Status: SHIPPED | OUTPATIENT
Start: 2023-08-29

## 2023-08-29 NOTE — ASSESSMENT & PLAN NOTE
Probably kidney stone related but we'll check this next time and make sure it's resolved.  Take a multivitamin in the meantime

## 2023-08-29 NOTE — PATIENT INSTRUCTIONS
Monitor your blood pressure to  make sure it's not dropping down.    Your anemia may be due to the kidney stone but take a multivitamin and plan follow up in 6 months.

## 2023-08-29 NOTE — PROGRESS NOTES
"Answers submitted by the patient for this visit:  Primary Reason for Visit (Submitted on 8/27/2023)  What is the primary reason for your visit?: High Blood Pressure  Subjective     Carolina Frias is a 33 y.o. female who presents with   Chief Complaint   Patient presents with    Hypertension       Hypertension       Hypertension on nifedipine XL 60mg and is inconsistent with use.  Manages about 3 times a week.  Her Dad has atrial fibrillation and she's worried she may develop that. In discussing risk factors for atrial fibrillation we discussed prior drug use.  She did have an issue with low blood pressure with a school birthday party but not typically.     She does have a history of cocaine addiction and was able to get clean and has done well.  She did get pregnant and gave up the child back then.  When she got pregnant 2 years later with her 10 year old(Giorgi), she got her life together.  She also has a 2 year old.  She does use MJ edibles for PTSD and Borderline Personality Disorder (BPD).   She's healed a lot over the past several years.     Migraine headaches that are becoming less frequent and needs a renewal of Maxalt.     She has a kidney stone and is scheduled for lithotripsy. And had anemia on her labs.  Has had a hysterectomy.     Working 2 days a week at [employer] and getting her Masters in Psychology.              Review of Systems     Objective     /84 (BP Location: Right arm, Patient Position: Sitting, Cuff Size: Large Adult)   Pulse 102   Temp 97.7 øF (36.5 øC) (Oral)   Ht 158.8 cm (62.52\")   Wt 129 kg (284 lb 8 oz)   SpO2 97%   BMI 51.17 kg/mý     Physical Exam  Constitutional:       Appearance: Normal appearance.   Cardiovascular:      Rate and Rhythm: Normal rate and regular rhythm.      Heart sounds: Normal heart sounds.   Pulmonary:      Effort: Pulmonary effort is normal.      Breath sounds: Normal breath sounds.   Neurological:      Mental Status: She is alert. "   Psychiatric:         Behavior: Behavior normal.       Procedures     Assessment & Plan   Diagnoses and all orders for this visit:    1. Essential hypertension (Primary)  Assessment & Plan:  On Nifedipine XL with inconsistent use.       2. Migraine without aura and without status migrainosus, not intractable  Assessment & Plan:  On prn Maxalt.      3. PTSD (post-traumatic stress disorder)  Assessment & Plan:  Uses edible marijuana.      4. Kidney stone  Assessment & Plan:  With planned lithotripsy tomorrow.      5. Anemia due to blood loss  Assessment & Plan:  Probably kidney stone related but we'll check this next time and make sure it's resolved.  Take a multivitamin in the meantime            Discussion    Patient Instructions   Monitor your blood pressure to  make sure it's not dropping down.    Your anemia may be due to the kidney stone but take a multivitamin and plan follow up in 6 months.                 Hansa Daniels MD

## 2023-09-26 ENCOUNTER — OFFICE VISIT (OUTPATIENT)
Dept: FAMILY MEDICINE CLINIC | Facility: CLINIC | Age: 33
End: 2023-09-26
Payer: MEDICAID

## 2023-09-26 VITALS
SYSTOLIC BLOOD PRESSURE: 135 MMHG | DIASTOLIC BLOOD PRESSURE: 94 MMHG | WEIGHT: 280 LBS | BODY MASS INDEX: 49.61 KG/M2 | HEIGHT: 63 IN | TEMPERATURE: 97.6 F | HEART RATE: 107 BPM | OXYGEN SATURATION: 98 %

## 2023-09-26 DIAGNOSIS — R06.83 SNORING: ICD-10-CM

## 2023-09-26 DIAGNOSIS — D50.0 ANEMIA DUE TO BLOOD LOSS: ICD-10-CM

## 2023-09-26 DIAGNOSIS — I10 ESSENTIAL HYPERTENSION: ICD-10-CM

## 2023-09-26 DIAGNOSIS — R53.83 OTHER FATIGUE: Primary | ICD-10-CM

## 2023-09-26 PROBLEM — N93.9 ABNORMAL UTERINE BLEEDING (AUB): Status: RESOLVED | Noted: 2023-01-27 | Resolved: 2023-09-26

## 2023-09-26 PROBLEM — R87.613 HGSIL ON CYTOLOGIC SMEAR OF CERVIX: Status: RESOLVED | Noted: 2020-08-04 | Resolved: 2023-09-26

## 2023-09-26 PROBLEM — Z30.09 STERILIZATION CONSULT: Status: RESOLVED | Noted: 2021-06-04 | Resolved: 2023-09-26

## 2023-09-26 PROBLEM — N20.0 KIDNEY STONE: Status: RESOLVED | Noted: 2023-08-29 | Resolved: 2023-09-26

## 2023-09-26 NOTE — PROGRESS NOTES
Answers submitted by the patient for this visit:  Office Visit on 9/26/2023  9:00 AM with Gisel Soto MD  Other (Submitted on 9/26/2023)  Please describe your symptoms.: Dr. GARCIA and myself were in discussion about possible anemia. We figured they would have taken a CBC at my kidney stone blasting. They did not, I have been constantly fatigued, just low energy, body pains and headaches.  Have you had these symptoms before?: Yes  How long have you been having these symptoms?: 1-2 weeks  Primary Reason for Visit (Submitted on 9/26/2023)  What is the primary reason for your visit?: Other    Chief Complaint  Chief Complaint   Patient presents with    Fatigue     Subjective    History of Present Illness  Carolina Frias is a 33 y.o. female presents to [facility] with 1 months of fatigue. She had a hysterectomy in May 2023 for endometrial abnormality and heavy bleeding, her ovaries were not removed. Felt good initially after procedure but in the last month has started feeling really bad.   Associated symptoms: night sweats, muscle pain/body aches, constipation, hair loss  Relieved by: nothing  Worsened by: stress  Patient has tried coffee, which isn't helping anymore; B12 supplement, herbal tea (chamomile, lavender).  Impact on ability to work, socialize, participate in usual activities: yes  Accommodations that the patient/loved ones have had to make to deal with symptoms: yes- fiance has had to do more at home    Sleep habits  Patient does have trouble falling asleep/staying asleep.  Patient does have a bedtime routine. Gets youngest child to bed, then she will tidy up the kitcne with fiance, relax with fiance before he goes to work night shift, after he leaves she will gets ready for bed, then will drink tea/read a book/look at her phone.   Patient goes to bed at 10:30-11pm  Patient wakes up for the day at 5:30-6am  Patient describes diet as pretty healthy  Physical activity: has not  been as active recently due to fatigue  Patient does have a television in the bedroom.   Patient does drink caffeine; drinks 2 cups a day, last cup of day before lunch  Patient does not drink alcohol, does not routinely drink alcohol before bed  The patient IS NOT taking medications to help with sleep. Has tried melatonin    Infectious  Patient does not have recent illness.  Patient does not have sick contacts.   Patient does not have recent URI or GI symptoms.   Patient does not have a new sexual partner or recent unprotected intercourse.   Patient does not have a history of IVDU.     Thyroid  fatigue, weight gain, constipation, swelling, feeling slow, losing hair, and sweating    Anemia  Patient does not have heavy periods- do not have uterus  Patient does have easy bruising.  Patient does have a history of anemia- low CBC prior to surgery in 2023.   Patient does not have a history of a clotting or bleeding disorder.     RLS  Patient does have leg pain, tingling feeling in legs, or leg movements while sleeping.   Patient does not have a family history of RLS.     MH  Patient does have recurrent nightmares, occasionally has sleep paralysis  Patient does have anxiety/depression and PTSD.    ZHENG  There is snoring, tossing and turning, decreased concentration, decreased sexual drive, completely or partially paralyzed while falling asleep or waking up, excessive daytime sleepiness, feels sleepy during the day  Patient's bed partner does not elbow them awake at night.   The patient has not had a sleep study.     CHF/COPD  Patient does have orthopnea sometimes.  Patient does not sleep on multiple pillows or in a recliner.  Patient does not have lower leg edema.   Patient does have shortness of breath with activity.   Patient does have a cough since the fatigue started.     Red flags:   There  is no fever.  There  is no unintentional weight loss.   There is night sweats.   There is no lymphadenopathy.   There is no muscle  "weakness.       Objective   Vitals:    09/26/23 0855   BP: 135/94   BP Location: Right arm   Patient Position: Sitting   Cuff Size: Large Adult   Pulse: 107   Temp: 97.6 °F (36.4 °C)   TempSrc: Oral   SpO2: 98%   Weight: 127 kg (280 lb)   Height: 158.8 cm (62.52\")        Physical Exam  Vitals reviewed.   Constitutional:       General: She is not in acute distress.     Appearance: Normal appearance.      Comments: DBP elevated   HENT:      Head: Normocephalic and atraumatic.      Right Ear: Ear canal and external ear normal.      Left Ear: Ear canal and external ear normal.      Ears:      Comments: Slight erythema R TM, some scarring bilat TM     Nose: Nose normal. No rhinorrhea.      Mouth/Throat:      Mouth: Mucous membranes are moist.      Pharynx: No posterior oropharyngeal erythema.      Comments: Mallampati 3  Eyes:      Extraocular Movements: Extraocular movements intact.      Conjunctiva/sclera: Conjunctivae normal.      Pupils: Pupils are equal, round, and reactive to light.   Neck:      Comments: Thyroid palpable, no nodules on palpation  Cardiovascular:      Rate and Rhythm: Normal rate and regular rhythm.      Pulses: Normal pulses.      Heart sounds: Normal heart sounds. No murmur heard.  Pulmonary:      Effort: Pulmonary effort is normal.      Breath sounds: Normal breath sounds. No wheezing.   Abdominal:      General: Bowel sounds are normal. There is no distension.      Palpations: Abdomen is soft.      Tenderness: There is no abdominal tenderness.   Musculoskeletal:         General: No tenderness or deformity. Normal range of motion.      Cervical back: Normal range of motion.      Right lower leg: No edema.      Left lower leg: No edema.   Lymphadenopathy:      Cervical: No cervical adenopathy.   Skin:     General: Skin is warm and dry.      Capillary Refill: Capillary refill takes less than 2 seconds.      Findings: No lesion or rash.   Neurological:      General: No focal deficit present.      " Mental Status: She is alert and oriented to person, place, and time.      Gait: Gait normal.      Deep Tendon Reflexes: Reflexes abnormal (patellar reflex 1+).   Psychiatric:         Mood and Affect: Mood normal.         Behavior: Behavior normal.         Judgment: Judgment normal.         The following data was reviewed by: Gisel Soto MD on 09/26/2023:  CBC          1/17/2023    16:00 5/23/2023    21:05   CBC   WBC 11.40     10.11       RBC 5.16     5.16       Hemoglobin 12.3     11.5       Hematocrit 39.5     37.8       MCV 76.6     73.3       MCH 23.8     22.3       MCHC 31.1     30.4       RDW 13.2     14.0       Platelets 496     490          Details          This result is from an external source.             CBC w/diff          1/17/2023    16:00 5/23/2023    21:05   CBC w/Diff   WBC 11.40     10.11       RBC 5.16     5.16       Hemoglobin 12.3     11.5       Hematocrit 39.5     37.8       MCV 76.6     73.3       MCH 23.8     22.3       MCHC 31.1     30.4       RDW 13.2     14.0       Platelets 496     490       Neutrophil Rel % 71.1     68.4       Immature Granulocyte Rel % 0.5     0.2       Lymphocyte Rel % 24.1     27.1       Monocyte Rel % 3.4     2.7       Eosinophil Rel % 0.5     1.2       Basophil Rel % 0.4     0.4          Details          This result is from an external source.             Urology, last PCP note note    STOP-BANG Score for Obstructive Sleep Apnea from [employer]  on 9/26/2023  ** All calculations should be rechecked by clinician prior to use **    RESULT SUMMARY:  5 points  STOP-BANG    High   Risk for moderate to severe ZHENG      INPUTS:  Do you snore loudly? --> 1 = Yes  Do you often feel tired, fatigued, or sleepy during the daytime? --> 1 = Yes  Has anyone observed you stop breathing during sleep? --> 0 = No  Do you have (or are you being treated for) high blood pressure? --> 1 = Yes  BMI --> 1 = >35 kg/m²  Age --> 0 = ?50 years  Neck circumference --> 1 = >40 cm  Gender --> 0  = Female      Assessment and Plan  Carolina Frias is a 33 y.o. female presents to [facility] with 1 months of fatigue. Based on history, poss RLS vs thyroid vs ZHENG, possible combination of etiologies with comorbid PTSD complicating symptoms.  - fatigue labs- BMP, CBC, TSH, ferritin, vitamin-D, EBV/CMV  - discussed sleep hygiene  - ref to sleep medicine for sleep study  - followup as needed      Diagnoses and all orders for this visit:    1. Other fatigue (Primary)  -     Comprehensive Metabolic Panel  -     CBC & Differential  -     Lipid Panel  -     TSH Rfx On Abnormal To Free T4  -     Vitamin D,25-Hydroxy  -     Hemoglobin A1c  -     Ferritin  -     Cancel: POC Infectious Mononucleosis Antibody  -     EBV Antibody Profile  -     CMV IgG IgM  -     Mononucleosis Test, Qual With Reflex  -     Ambulatory Referral to Sleep Medicine    2. Anemia due to blood loss  -     CBC & Differential  -     Ferritin    3. Snoring  -     Ambulatory Referral to Sleep Medicine    4. Essential hypertension  Comments:  DBP slightly elevated today, will cont to monitor. May adjust BP meds as indicated.  Orders:  -     Ambulatory Referral to Sleep Medicine        Patient voiced understanding and agreement with plan of care and had no further questions or concerns at this time.     I spent 38 minutes on this encounter, including chart review of any relevant previous encounters, labs, and imaging;  >%50% of encounter spent face-to-face with the patient or coordinating care.    Gisel Soto MD  Family Medicine  [facility]    Follow Up  Return if symptoms worsen or fail to improve.    Patient Instructions   Today: check bloodwork, optimizing sleep hygiene, can try lemon balm with herbal tea, can consider magnesium gummies or magnesium lotion before sleep    Referrals: sleep medicine for sleep study    Sleep hygiene:  - have set bedtime and set wake time  - avoid caffeine 8-10hrs  before goal sleep time  - reduce screen time in the evening and avoid screen within 30 mins of goal bedtime  - establish a bedtime routine in which you do the same things in the same order to cue your body that it is time to sleep (ex: drink cup of sleepytime tea and journal or meditate, bedtime yoga/stretches, brush teeth, etc)  - use bed only sleep and sex.

## 2023-09-26 NOTE — PATIENT INSTRUCTIONS
Today: check bloodwork, optimizing sleep hygiene, can try lemon balm with herbal tea, can consider magnesium gummies or magnesium lotion before sleep    Referrals: sleep medicine for sleep study    Sleep hygiene:  - have set bedtime and set wake time  - avoid caffeine 8-10hrs before goal sleep time  - reduce screen time in the evening and avoid screen within 30 mins of goal bedtime  - establish a bedtime routine in which you do the same things in the same order to cue your body that it is time to sleep (ex: drink cup of sleepytime tea and journal or meditate, bedtime yoga/stretches, brush teeth, etc)  - use bed only sleep and sex.

## 2023-09-27 LAB
25(OH)D3+25(OH)D2 SERPL-MCNC: 16.8 NG/ML (ref 30–100)
ALBUMIN SERPL-MCNC: 4.8 G/DL (ref 3.9–4.9)
ALBUMIN/GLOB SERPL: 1.7 {RATIO} (ref 1.2–2.2)
ALP SERPL-CCNC: 99 IU/L (ref 44–121)
ALT SERPL-CCNC: 24 IU/L (ref 0–32)
AST SERPL-CCNC: 27 IU/L (ref 0–40)
BASOPHILS # BLD AUTO: 0 X10E3/UL (ref 0–0.2)
BASOPHILS NFR BLD AUTO: 0 %
BILIRUB SERPL-MCNC: 0.2 MG/DL (ref 0–1.2)
BUN SERPL-MCNC: 11 MG/DL (ref 6–20)
BUN/CREAT SERPL: 15 (ref 9–23)
CALCIUM SERPL-MCNC: 9.3 MG/DL (ref 8.7–10.2)
CHLORIDE SERPL-SCNC: 96 MMOL/L (ref 96–106)
CHOLEST SERPL-MCNC: 177 MG/DL (ref 100–199)
CMV IGG SERPL IA-ACNC: 9.3 U/ML (ref 0–0.59)
CMV IGM SERPL IA-ACNC: <30 AU/ML (ref 0–29.9)
CO2 SERPL-SCNC: 21 MMOL/L (ref 20–29)
CREAT SERPL-MCNC: 0.74 MG/DL (ref 0.57–1)
EBV NA IGG SER IA-ACNC: 96.8 U/ML (ref 0–17.9)
EBV VCA IGG SER IA-ACNC: >600 U/ML (ref 0–17.9)
EBV VCA IGM SER IA-ACNC: <36 U/ML (ref 0–35.9)
EGFRCR SERPLBLD CKD-EPI 2021: 110 ML/MIN/1.73
EOSINOPHIL # BLD AUTO: 0.1 X10E3/UL (ref 0–0.4)
EOSINOPHIL NFR BLD AUTO: 1 %
ERYTHROCYTE [DISTWIDTH] IN BLOOD BY AUTOMATED COUNT: 16.2 % (ref 11.7–15.4)
FERRITIN SERPL-MCNC: 26 NG/ML (ref 15–150)
GLOBULIN SER CALC-MCNC: 2.9 G/DL (ref 1.5–4.5)
GLUCOSE SERPL-MCNC: 135 MG/DL (ref 70–99)
HBA1C MFR BLD: 5.8 % (ref 4.8–5.6)
HCT VFR BLD AUTO: 41.6 % (ref 34–46.6)
HDLC SERPL-MCNC: 77 MG/DL
HETEROPH AB SER QL LA: NEGATIVE
HGB BLD-MCNC: 13.5 G/DL (ref 11.1–15.9)
IMM GRANULOCYTES # BLD AUTO: 0 X10E3/UL (ref 0–0.1)
IMM GRANULOCYTES NFR BLD AUTO: 0 %
LDLC SERPL CALC-MCNC: 77 MG/DL (ref 0–99)
LYMPHOCYTES # BLD AUTO: 2 X10E3/UL (ref 0.7–3.1)
LYMPHOCYTES NFR BLD AUTO: 27 %
MCH RBC QN AUTO: 23.6 PG (ref 26.6–33)
MCHC RBC AUTO-ENTMCNC: 32.5 G/DL (ref 31.5–35.7)
MCV RBC AUTO: 73 FL (ref 79–97)
MONOCYTES # BLD AUTO: 0.3 X10E3/UL (ref 0.1–0.9)
MONOCYTES NFR BLD AUTO: 4 %
NEUTROPHILS # BLD AUTO: 5.1 X10E3/UL (ref 1.4–7)
NEUTROPHILS NFR BLD AUTO: 68 %
PLATELET # BLD AUTO: 409 X10E3/UL (ref 150–450)
POTASSIUM SERPL-SCNC: 4 MMOL/L (ref 3.5–5.2)
PROT SERPL-MCNC: 7.7 G/DL (ref 6–8.5)
RBC # BLD AUTO: 5.72 X10E6/UL (ref 3.77–5.28)
SERVICE CMNT-IMP: ABNORMAL
SODIUM SERPL-SCNC: 139 MMOL/L (ref 134–144)
TRIGL SERPL-MCNC: 137 MG/DL (ref 0–149)
TSH SERPL DL<=0.005 MIU/L-ACNC: 2.59 UIU/ML (ref 0.45–4.5)
VLDLC SERPL CALC-MCNC: 23 MG/DL (ref 5–40)
WBC # BLD AUTO: 7.5 X10E3/UL (ref 3.4–10.8)

## 2023-09-28 DIAGNOSIS — R89.9 ABNORMAL LABORATORY TEST RESULT: Primary | ICD-10-CM

## 2023-09-28 NOTE — PROGRESS NOTES
Hello!    Here are the results of your most recent labs:    Your monospot, Cholesterol, Comprehensive Metabolic Panel, and Thyroid Function was all normal.     Your vitamin D, ferritin, EBV/CMV, Blood Sugar, CBC, and Hgb A1C was abnormal.     I recommend supplementing with OTC vitamin D 1000 IU daily for 3-6 months, then take 400 IU daily or a prenatal vitamin.     Your CBC is consistent with persistent anemia, but improved from the previous lab draw a few months ago. Your ferritin was 26, goal is 50-75 at least. I recommend oral iron supplementation for 12-16 weeks. You can get OTC iron 325mg (the bottle may say 65mg elemental iron), any brand is ok, and take one every day. I recommend taking iron with a vitamin C containing beverage such as orange juice to enhance absorption. I fpossible, avoid dairy, Tums, and coffee/tea around when you take iron as they may decrease absorption.  Common side effects of iron supplementation include some stomach upset and constipation. Be sure to drink enough water and eat enough fiber when taking iron, you may use miralax if needed for constipation. Your poop may be dark green while taking iron, that is normal. You should notice an improvement within 6-8 weeks of daily iron supplementation as it takes a bit for the body to rebuild the stores it needs. If you do not notice an improvement, please let me know and we can repeat labs.      Your blood sugar was elevated, this is consistent with the elevated A1c, it is in the prediabetic range.  For diet and lifestyle interventions I recommend decreasing carbohydrate intake to 100-150 g/day, reducing processed food, increasing fruit and vegetable intake, at least 120 minutes of physical activity per week as tolerated, and controlling blood pressure with a goal of less than 120/80.     Your EBV and CMV IgG were both elevated, which indicates a recent or past infection. Given your symptoms have been present for a month, it may be that you  had an EBV or CMV infection at the onset and your body is still recovering.     Follow-up in 3 months with repeat of labs prior to appointment so we can go over them. I have ordered them to be drawn at [facility] or any other [facility] [place] lab at your convenience.       Please continue your current medications.  Please contact me with any questions.    Thank you!  Dr. Soto

## 2023-10-05 ENCOUNTER — OFFICE VISIT (OUTPATIENT)
Dept: SLEEP MEDICINE | Facility: HOSPITAL | Age: 33
End: 2023-10-05
Payer: MEDICAID

## 2023-10-05 VITALS
WEIGHT: 280 LBS | HEIGHT: 62 IN | SYSTOLIC BLOOD PRESSURE: 150 MMHG | BODY MASS INDEX: 51.53 KG/M2 | DIASTOLIC BLOOD PRESSURE: 100 MMHG | OXYGEN SATURATION: 98 % | HEART RATE: 112 BPM

## 2023-10-05 DIAGNOSIS — R29.818 SUSPECTED SLEEP APNEA: Primary | ICD-10-CM

## 2023-10-05 DIAGNOSIS — F43.10 PTSD (POST-TRAUMATIC STRESS DISORDER): ICD-10-CM

## 2023-10-05 DIAGNOSIS — F41.9 ANXIETY: ICD-10-CM

## 2023-10-05 DIAGNOSIS — I10 ESSENTIAL HYPERTENSION: ICD-10-CM

## 2023-10-05 DIAGNOSIS — G47.19 EXCESSIVE DAYTIME SLEEPINESS: ICD-10-CM

## 2023-10-05 DIAGNOSIS — R06.83 SNORING: ICD-10-CM

## 2023-10-05 DIAGNOSIS — K21.9 GASTROESOPHAGEAL REFLUX DISEASE, UNSPECIFIED WHETHER ESOPHAGITIS PRESENT: ICD-10-CM

## 2023-10-05 DIAGNOSIS — Z72.821 INADEQUATE SLEEP HYGIENE: ICD-10-CM

## 2023-10-05 PROCEDURE — G0463 HOSPITAL OUTPT CLINIC VISIT: HCPCS

## 2023-10-05 NOTE — PROGRESS NOTES
[facility]  [address]  [address]  [place] [address]  Phone   Fax       Carolina Frias  [id number]   1990  33 y.o.  female      Referring physician/provider(S)  PCP(s) Hansa Daniels MD and Dr. Corey Soto    Type of service: Initial Sleep Medicine Consult.  Date of service: 10/5/2023      Chief Complaint   Patient presents with    Snoring       History of present illness;  Thank you for asking to see Carolina Frias, 33 y.o. PMHx of HTN, Anxiety, PTSD, Borderline Personality Disorder, Migraines. The patient was seen today on 10/5/2023 at [facility].  The patient presents today with symptoms of snoring, non-restorative sleep and witnessed apneas. The symptoms are present for >1 year and they are persistent in nature.  The snoring is present in all positions and it is loud.  Patient  endorses  prior surgery namely tonsillectomy in 1999 for recurrent infections, denies nasal surgery or UPPP.       -BP in sleep clinic is 162/105 (automated)  We did repeat a manual 150/100  +Feels anxious in sleep clinic   Denies chest-pain/dyspnea/focal-weakness/confusion/anuria/pain   Compliant with home therapy       Obstructive Sleep Apnea Screening: STOP-BANG Sleep Apnea Questionnaire. Reference: Mark F et al. Br J Anaesth, 2012.     Criterion    Yes    No  Do you SNORE loudly?   [x]   Yes  []   No   Do you often feel TIRED, fatigued, or sleepy during the day?    [x]   Yes  []   No  Has anyone OBSERVED you stop breathing during your sleep?    []   Yes  [x]   No  Do you have or are you being treated for high blood PRESSURE?    [x]   Yes  []   No  BMI >32 kg/m2     [x]   Yes  []   No  AGE > 50 years    []   Yes  [x]   No  NECK circumference >16 inches / 40 cm    [x]   Yes  []   No  GENDER: male     []   Yes  [x]   No    ZHENG Probability:  []   1-2 - Low  []   3-4 - Intermediate  [x]   5-8 - High      Sleep Disturbance  "History    Difficulty initiating sleep - yes  7 nights a week   Onset: >` year    Difficulty Maintaining sleep - yes  Frequency: 7 nights / week  Onet >1 year     Sleep History    Bedtime: Weekdays 10:30 PM to 11 PM; weekends 10:30 PM to midnight  Rise Time: Weekdays 5:30 AM to 6:30 AM; weekends 8 AM to 9 AM  Sleep Latency: Hours - will be on phone, reads a book on a myrtle, stays in bed eyes open and eyes closed until she falls asleep, goes to the kitchen and \"just sit there on my phone\"  Awakenings after sleep onset (WASO): >3x  Reasons for WASO: Nocturi, wakes up gasping for air non-weekly last occurred 3 months ago  Time to return to sleep:  >20 minutes   When she can't go back to sleep: \"Just Play on my Phone\"     30 minutes prior to bedtime will Vape           Extrinsic Issues (a diagnosis of insomnia  cannot be exclusively due to inadequate opportunity or circumstances for sleep):     I.Adequate opportunity -  Yes    II. circumstances for sleep-  Noise - denies  Temperature - denies too hot or too cold  Light -  denies excessive light   Bedding - denies uncomfortable   Bed Partner -  denies issues with bed partner   Caffeine -  Coffee qam ; or energy drink 1 can of prime or monster energy drink before noon  Alcohol - denies  Substance use - +Marijuana gets them over the counter states she takes it throughout the day, has been using same >5 years  Screens in bed -  Yes phone/myrtle  Daytime napping - Naps 2x a week/ naps last 2 hours / feels the same after nap  Daytime use of bed for activities other than sleep -  Does 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)  patient unable to identify other extrinsic causes:  PSTD  from past experiences - no longer seeing a psychiatrist   \"I can't shut off my brain\"   Nocutria       Intrinsic disturbances endorsed to contribute to sleep initiation or sleep maintenance by patient:   Pain - no  GERD -  +dyspepsia described as metallic taste/\"I have " "this burning sensation\" points to episternal notch \"I always attribute it to heart burn\"  Depression -  denies PHQ2 is zero  Anxiety - At onset of visit she endorses anxiety  Rumination - Yes   Sleep disordered breathing - undiagnosed  RLS - No positional  does not meet ICSD-3 criteria         Further Sleep History:    Bedtime: Weekdays 10:30 PM to 11 PM; weekends 10:30 PM to midnight  Rise Time: Weekdays 5:30 AM to 6:30 AM; weekends 8 AM to 9 AM  Sleep Latency: Hours  Screens in bed: Yes    Caffeine use: 1X coffee  Illicit substances: Yes Marijuana edibles she endorses as self directed has not discussed with her PCP    RLS Symptoms: No   Bruxism:No   Current sleep related gastroesophageal reflux symptoms:  No   Cataplexy:  No   Sleep Paralysis:  No   Hypnagogic or hypnopompic hallucinations: No   Parasomnias such as sleep walking or sleep eating No     Disclaimer Sleep History: The above sleep history is based on this sleep physician's in room encounter with the patient. Pre encounter self administered questionnaires are taken into consideration and discussed with patient for any discordance. The above documentation by this sleep physician is the most accurate clinical information determined by in room sleep physician encounter with patient.     MEDICAL CONDITIONS (PMH)   HTN  Anxiety  PTSD  Migraines    Social history:  Do you drive a commercial vehicle:  No   Shift work:  No   Tobacco use:  Yes 1-2 e-cigarettes a month  Alcohol use: 1 per month  Occupation: Studying in Masters in Psychology     Family Hx (parents and siblings) (pertaining to sleep medicine)  HTN  Sleep Apnea- father       Medications: reviewed    Review of systems is negative unless otherwise noted per HPI   Disclaimer History: The above history is based on this sleep physician's in room encounter with the patient. Pre encounter self administered questionnaires are taken into consideration and discussed with patient for any discordance. The above " "documentation by this sleep physician is the most accurate clinical information determined by in room sleep physician encounter with patient.     Physical exam:  Vitals:    10/05/23 1300   BP: 150/100   BP Location: Right arm   Patient Position: Sitting   Pulse: 112   SpO2: 98%   Weight: 127 kg (280 lb)   Height: 157.5 cm (62\")    Body mass index is 51.21 kg/m².   CONSTITUTIONAL:  Non-toxic, In no overt distress   Head: normocephalic   ENT: Mallampati class IV, + macroglossia, no septal defects   NECK:Neck Circumference: 16.25 inches,no nuchal rigidity  RESPIRATORY SYSTEM: Breath sounds are clear (no rales, no rhonchi, no wheezes), no accessory muscle use  CARDIOVASULAR SYSTEM: Heart sounds are regular rhythm and normal rate, no rub, no gallop, no edema  NEUROLOGICAL SYSTEM: Oriented x 3, No gross focal deficits   PSYCHIATRIC SYSTEM: Goal oriented, affect full range appropriate      Office notes from care team reviewed:    -9/26/23 Office Visit PCP Dr. Gisel Soto     Labs reviewed.  TSH          9/26/2023    00:00   TSH   TSH 2.590       Most Recent A1C          9/26/2023    00:00   HGBA1C Most Recent   Hemoglobin A1C 5.8             Assessment and plan:  Suspected sleep apnea [R29.818] patient's symptoms and examination is consistent with sleep apnea (G47.30). I have talked to the patient about the signs and symptoms of sleep apnea. In addition, I have also discussed pathophysiology of sleep apnea.  I also discussed the complications of untreated sleep apnea including effects on hypertension, diabetes mellitus and nonrestorative sleep with hypersomnia which can increase risk for motor vehicle accidents.  Untreated sleep apnea is also a risk factor for development of atrial fibrillation, hypertension, insulin resistance and cerebrovascular accident.  Discussed in detail of various testing methods including home-based and lab based sleep studies.  Based on history and physical examination and other comorbidities " the most appropriate study is Home Sleep Study.  The order for the sleep study is placed in [facility].  The test will be scheduled after approval from insurance. Treatment and management will be discussed after the test is completed. High pretest probability STOP-BANG 5/8, macroglossia, Mallampati is IV.  Home sleep study may rule in sleep apnea.  However, under patient's specific clinical circumstances home sleep study may not rule out sleep apnea.  If home sleep testing is negative must proceed with in laboratory polysomnography to definitively rule out sleep apnea (the prior was discussed with patient). Patient was given opportunity to ask questions and all the questions were answered.   Snoring (R06.83), snoring is the sound created by turbulent airflow vibrating upper airway soft tissue due to limitation of inspiratory airflow. I have also discussed factors affecting snoring including sleep deprivation, sleeping on the back and alcohol ingestion. To minimize snoring, patient is advised to have adequate sleep, sleep on the side and avoid alcohol and sedative medications before bedtime  Excessive daytime sleepiness .  Patient endorses subjective excessive daytime sleepiness with sleep physician encounter which was inconsistent with patient's pre-encounter self-administered Cameron Sleepiness Scale of Total score: 5.  There are many causes for daytime excessive sleepiness including sleep depression, shiftwork syndrome, depression and other medical disorders including heart, kidney and liver failure.  The most serious cause of excessive sleepiness is due to neurological conditions like narcolepsy/cataplexy.  But the most common cause of excessive sleepiness is due to sleep apnea with frequent awakenings during sleep time.  I have discussed safety of driving and to remain vigilant while driving.  Obesity, patient's BMI is Body mass index is 51.21 kg/m².. I have discussed the relationship between weight and sleep  apnea.There is direct correlation between weight and severity of sleep apnea.  Weight reduction is encouraged, as it is going to reduce the severity of sleep apnea. I have also discussed with the patient diet and exercise to achieve ideal body weight.  Inadequate Sleep hygiene: Counseling as below. multiple inadequate circumstances for sleep preclude a diagnosis of insomnia.  Barriers identified: Screens in bed (phone/myrtle), Prolonged periods in bed, 2 hour naps 2x a week, Rumination in bed, Acid Reflux, daytime activities (studies in bed for masters program in psychology)  Counseled thoroughly for stimulus control. Counseled to apply stimulus control counseling for night of sleep study especially.  If no sleep apnea follow up with PCP to reinforce stimulus control counseling, and then address comorbid psychiatric disorders and concern for insomnia due to mental condition.  HTN,  Asymptomatic in sleep clinic save anxiety. Counseled patient ED/911 for alarm signs symptoms reviewed with her in clinic. Counseledollow up with primary care physician today. Poorly controlled hypertension can precipitate sleep disturbances. This medical condition would make the patient eligible for a trial of PAP therapy even if sleep study reveals mild severity sleep apnea.  Anxiety/PTSD/Borderline Personality disorder, Follow up with primary care physician for continued management. Comorbid Mood Disorders would make the patient eligible for a trial of PAP therapy even if sleep study reveals mild severity sleep apnea. PTSD (not on medical management for same - endorsed as causing sleep disturbance). A referral to psychiatry is advised to her PCP to rule out insomnia due to mental condition if sleep disturbances persists after enacting lifestyle modifications counseling today by me.   GERD, Follow up with PCP for same as endorsed as contributing to sleep disturbance and has not talked to PCP about same in the past. Untreated GERD is a  known precipitant of sleep disturbances.     I have also discussed with the patient the following  Sleep hygiene/Stimulus Control Counseling: Maintaining a regular bedtime and wake time, not to watch television or work in bed, limit caffeine-containing beverages before bed time and avoid naps during the day will take away from nocturnal sleep, if unable to sleep for 20 minutes or more then must get out of bed to go to dimly lit or dark area and pursue going activity to return to bed only when sleepy (repeat this as often as necessary)  Adequate amount of sleep.  Generally most people needs about 7 to 8 hours of sleep.      Return for 31 to 90 days after PAP setup with down load.  Patient's questions were answered      I once again thank you for asking me to see this patient in consultation and I have forwarded my opinion and treatment plan.  Please do not hesitate to call me if you have any questions.       Time based Visit 65 Minutes: To involve for in room history/PE/counseling/discussion of plan with patient. Identification of multiple causes of sleep disturbances as listed above. Counseling for marked HTN in sleep clinic and ED/911 precautions. Complex medical decision making leading to HST order and plan for future sleep study if necessary       EMR Dragon/Transcription disclaimer:   Much of this encounter note is an electronic transcription/translation of spoken language to printed text. The electronic translation of spoken language may permit erroneous, or at times, nonsensical words or phrases to be inadvertently transcribed; Although I have reviewed the note for such errors, some may still exist.     NPI #: [id number]    Jo Thompson, DO  Sleep Medicine  [facility]  10/05/23

## 2023-10-12 ENCOUNTER — HOSPITAL ENCOUNTER (OUTPATIENT)
Dept: SLEEP MEDICINE | Facility: HOSPITAL | Age: 33
End: 2023-10-12
Payer: MEDICAID

## 2023-10-12 DIAGNOSIS — R06.83 SNORING: ICD-10-CM

## 2023-10-12 DIAGNOSIS — G47.19 EXCESSIVE DAYTIME SLEEPINESS: ICD-10-CM

## 2023-10-12 DIAGNOSIS — R29.818 SUSPECTED SLEEP APNEA: ICD-10-CM

## 2023-10-12 PROCEDURE — 95806 SLEEP STUDY UNATT&RESP EFFT: CPT

## 2023-10-13 DIAGNOSIS — G47.33 OBSTRUCTIVE SLEEP APNEA, ADULT: Primary | ICD-10-CM

## 2023-10-13 DIAGNOSIS — G47.34 SLEEP RELATED HYPOXIA: ICD-10-CM

## 2023-10-18 ENCOUNTER — TELEPHONE (OUTPATIENT)
Dept: SLEEP MEDICINE | Facility: HOSPITAL | Age: 33
End: 2023-10-18
Payer: MEDICAID

## 2023-10-18 NOTE — TELEPHONE ENCOUNTER
Called patient with HST results. No answer left message per verbal release. Advised patient to call back and schedule a titration study.

## 2023-10-25 ENCOUNTER — TELEPHONE (OUTPATIENT)
Dept: SLEEP MEDICINE | Facility: HOSPITAL | Age: 33
End: 2023-10-25
Payer: MEDICAID

## 2023-11-09 ENCOUNTER — HOSPITAL ENCOUNTER (OUTPATIENT)
Dept: SLEEP MEDICINE | Facility: HOSPITAL | Age: 33
End: 2023-11-09
Payer: MEDICAID

## 2023-11-09 DIAGNOSIS — G47.34 SLEEP RELATED HYPOXIA: ICD-10-CM

## 2023-11-09 DIAGNOSIS — G47.33 OBSTRUCTIVE SLEEP APNEA, ADULT: ICD-10-CM

## 2023-11-09 PROCEDURE — 95811 POLYSOM 6/>YRS CPAP 4/> PARM: CPT

## 2023-11-10 DIAGNOSIS — G47.33 SEVERE OBSTRUCTIVE SLEEP APNEA: Primary | ICD-10-CM

## 2023-11-10 PROCEDURE — 95811 POLYSOM 6/>YRS CPAP 4/> PARM: CPT | Performed by: FAMILY MEDICINE

## 2023-11-16 ENCOUNTER — TELEPHONE (OUTPATIENT)
Dept: SLEEP MEDICINE | Facility: HOSPITAL | Age: 33
End: 2023-11-16
Payer: MEDICAID

## 2023-12-11 ENCOUNTER — OFFICE VISIT (OUTPATIENT)
Dept: FAMILY MEDICINE CLINIC | Facility: CLINIC | Age: 33
End: 2023-12-11
Payer: MEDICAID

## 2023-12-11 VITALS
BODY MASS INDEX: 53 KG/M2 | HEART RATE: 94 BPM | WEIGHT: 288 LBS | DIASTOLIC BLOOD PRESSURE: 96 MMHG | SYSTOLIC BLOOD PRESSURE: 133 MMHG | OXYGEN SATURATION: 98 % | HEIGHT: 62 IN

## 2023-12-11 DIAGNOSIS — H66.92 LEFT ACUTE OTITIS MEDIA: Primary | ICD-10-CM

## 2023-12-11 PROCEDURE — 3075F SYST BP GE 130 - 139MM HG: CPT | Performed by: FAMILY MEDICINE

## 2023-12-11 PROCEDURE — 99213 OFFICE O/P EST LOW 20 MIN: CPT | Performed by: FAMILY MEDICINE

## 2023-12-11 PROCEDURE — 3080F DIAST BP >= 90 MM HG: CPT | Performed by: FAMILY MEDICINE

## 2023-12-11 RX ORDER — FLUCONAZOLE 150 MG/1
150 TABLET ORAL
COMMUNITY
Start: 2023-12-11 | End: 2023-12-14

## 2023-12-11 RX ORDER — CEFDINIR 300 MG/1
300 CAPSULE ORAL 2 TIMES DAILY
Qty: 20 CAPSULE | Refills: 0 | Status: SHIPPED | OUTPATIENT
Start: 2023-12-11

## 2023-12-11 RX ORDER — PNV NO.95/FERROUS FUM/FOLIC AC 28MG-0.8MG
TABLET ORAL
COMMUNITY
Start: 2023-10-03

## 2023-12-11 RX ORDER — MELATONIN
COMMUNITY
Start: 2023-10-03

## 2023-12-11 NOTE — PROGRESS NOTES
"Chief Complaint  Earache (Left ear, 2 days, worried eardrum is busted ) and Sinusitis (Cough, mucus )    Subjective    History of Present Illness {CC  Problem List  Visit  Diagnosis   Encounters  Notes  Medications  Labs  Result Review Imaging  Media :23}     Carolina Frias presents to [facility] for Earache (Left ear, 2 days, worried eardrum is busted ) and Sinusitis (Cough, mucus ).    She presents with increased left ear pain x 2 days.  It was severe 2 nights ago and is concerned her ear drum may have burst. She has not had any drainage from the ear.  She is having increased sinus pressure and cough also. No known ill contacts but she does work in a .  She has an allergy to PCN but reports she has taking cephalosporins in the past without issue.     Objective     Vital Signs:   /96   Pulse 94   Ht 157.5 cm (62\")   Wt 131 kg (288 lb)   SpO2 98%   BMI 52.68 kg/m²   Body mass index is 52.68 kg/m².     Physical Exam  Constitutional:       General: She is not in acute distress.     Appearance: Normal appearance.   HENT:      Right Ear: Tympanic membrane normal.      Left Ear: Tympanic membrane is erythematous and bulging.          Result Review  Data Reviewed:{ Labs  Result Review  Imaging  Med Tab  Media :23}                Assessment and Plan {CC Problem List  Visit Diagnosis  ROS  Review (Popup)  [facility] Maintenance  Quality  BestPractice  Medications  SmartSets  SnapShot Encounters  Media :23}   Diagnoses and all orders for this visit:    1. Left acute otitis media (Primary)    Other orders  -     cefdinir (OMNICEF) 300 MG capsule; Take 1 capsule by mouth 2 (Two) Times a Day.  Dispense: 20 capsule; Refill: 0        Patient Instructions   Take antibiotic as directed.  Increase fluids and rest.  You can alternate ibuprofen and tylenol as needed for pain.  You can also try otc ear drops and warm copmpresses.       Patient was given " instructions and counseling regarding her condition or for health maintenance advice on the AVS.       No follow-ups on file.    Ebony Matt MD

## 2023-12-11 NOTE — PATIENT INSTRUCTIONS
Take antibiotic as directed.  Increase fluids and rest.  You can alternate ibuprofen and tylenol as needed for pain.  You can also try otc ear drops and warm copmpresses.

## 2023-12-21 RX ORDER — AZITHROMYCIN 250 MG/1
TABLET, FILM COATED ORAL
Qty: 6 TABLET | Refills: 0 | Status: SHIPPED | OUTPATIENT
Start: 2023-12-21

## 2024-02-15 ENCOUNTER — OFFICE VISIT (OUTPATIENT)
Dept: SLEEP MEDICINE | Facility: HOSPITAL | Age: 34
End: 2024-02-15
Payer: MEDICAID

## 2024-02-15 VITALS
DIASTOLIC BLOOD PRESSURE: 100 MMHG | HEIGHT: 62 IN | HEART RATE: 110 BPM | OXYGEN SATURATION: 98 % | WEIGHT: 282 LBS | SYSTOLIC BLOOD PRESSURE: 138 MMHG | BODY MASS INDEX: 51.89 KG/M2

## 2024-02-15 DIAGNOSIS — G47.33 SEVERE OBSTRUCTIVE SLEEP APNEA: Primary | ICD-10-CM

## 2024-02-15 DIAGNOSIS — I10 ESSENTIAL HYPERTENSION: ICD-10-CM

## 2024-02-15 DIAGNOSIS — Z72.0 NICOTINE USE: ICD-10-CM

## 2024-02-15 DIAGNOSIS — E66.01 CLASS 3 SEVERE OBESITY WITH BODY MASS INDEX (BMI) OF 50.0 TO 59.9 IN ADULT, UNSPECIFIED OBESITY TYPE, UNSPECIFIED WHETHER SERIOUS COMORBIDITY PRESENT: ICD-10-CM

## 2024-02-15 PROCEDURE — G0463 HOSPITAL OUTPT CLINIC VISIT: HCPCS

## 2024-03-01 ENCOUNTER — OFFICE VISIT (OUTPATIENT)
Dept: FAMILY MEDICINE CLINIC | Facility: CLINIC | Age: 34
End: 2024-03-01
Payer: MEDICAID

## 2024-03-01 VITALS
RESPIRATION RATE: 16 BRPM | OXYGEN SATURATION: 98 % | DIASTOLIC BLOOD PRESSURE: 78 MMHG | BODY MASS INDEX: 52.41 KG/M2 | WEIGHT: 284.8 LBS | HEIGHT: 62 IN | SYSTOLIC BLOOD PRESSURE: 130 MMHG

## 2024-03-01 DIAGNOSIS — E66.01 CLASS 3 SEVERE OBESITY DUE TO EXCESS CALORIES WITH SERIOUS COMORBIDITY AND BODY MASS INDEX (BMI) OF 50.0 TO 59.9 IN ADULT: ICD-10-CM

## 2024-03-01 DIAGNOSIS — M79.601 PARESTHESIA AND PAIN OF BOTH UPPER EXTREMITIES: ICD-10-CM

## 2024-03-01 DIAGNOSIS — R53.83 OTHER FATIGUE: ICD-10-CM

## 2024-03-01 DIAGNOSIS — R22.42 LOCALIZED SWELLING OF LEFT FOOT: ICD-10-CM

## 2024-03-01 DIAGNOSIS — M79.602 PARESTHESIA AND PAIN OF BOTH UPPER EXTREMITIES: ICD-10-CM

## 2024-03-01 DIAGNOSIS — I10 ESSENTIAL HYPERTENSION: ICD-10-CM

## 2024-03-01 DIAGNOSIS — G47.33 OSA (OBSTRUCTIVE SLEEP APNEA): ICD-10-CM

## 2024-03-01 DIAGNOSIS — R73.9 HYPERGLYCEMIA: ICD-10-CM

## 2024-03-01 DIAGNOSIS — R20.2 PARESTHESIA AND PAIN OF BOTH UPPER EXTREMITIES: ICD-10-CM

## 2024-03-01 DIAGNOSIS — R71.8 MICROCYTOSIS: ICD-10-CM

## 2024-03-01 DIAGNOSIS — E55.9 VITAMIN D DEFICIENCY: ICD-10-CM

## 2024-03-01 DIAGNOSIS — F41.1 GENERALIZED ANXIETY DISORDER: Primary | ICD-10-CM

## 2024-03-01 PROBLEM — E66.813 CLASS 3 SEVERE OBESITY DUE TO EXCESS CALORIES WITH SERIOUS COMORBIDITY AND BODY MASS INDEX (BMI) OF 50.0 TO 59.9 IN ADULT: Status: ACTIVE | Noted: 2024-03-01

## 2024-03-01 PROCEDURE — 3075F SYST BP GE 130 - 139MM HG: CPT | Performed by: FAMILY MEDICINE

## 2024-03-01 PROCEDURE — 3078F DIAST BP <80 MM HG: CPT | Performed by: FAMILY MEDICINE

## 2024-03-01 PROCEDURE — 99214 OFFICE O/P EST MOD 30 MIN: CPT | Performed by: FAMILY MEDICINE

## 2024-03-01 NOTE — PATIENT INSTRUCTIONS
I have ordered lab tests today.  You should receive a phone call or a MyChart message with those results.  If you have not heard from us in 7-10 days, please call the office.      We talked about compression stockings if needed for the swelling.

## 2024-03-01 NOTE — PROGRESS NOTES
"Subjective     Carolina Frias is a 33 y.o. female who presents with   Chief Complaint   Patient presents with    Hypertension       Hypertension       Having some numbness in her hands.  Seems to be glove like and feels shaky and like her  isn't good.     She gets swelling in the left foot and ankle at times and rarely on the other.     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  She is taking time to do yoga and starting to watch her diet some.  Her A1C was mildly elevated last time.    Anemia in the past and has had a hysterectomy but still feeling fatigued and \"blah\" even after the hysterectomy.  Taking iron and D because her D was low last time.    Hypertension on nifedipine.     She's using nibbles of gummies for her anxiety and PTSD.  She gets them legally from [place] or [place].              Review of Systems     Objective     /78   Resp 16   Ht 157.5 cm (62\")   Wt 129 kg (284 lb 12.8 oz)   LMP 05/09/2023 (Exact Date)   SpO2 98%   Breastfeeding No   BMI 52.09 kg/m²     Physical Exam  Constitutional:       Appearance: Normal appearance.   Cardiovascular:      Rate and Rhythm: Normal rate and regular rhythm.      Heart sounds: Normal heart sounds.   Pulmonary:      Effort: Pulmonary effort is normal.      Breath sounds: Normal breath sounds.   Musculoskeletal:      Comments: No swelling currently   Neurological:      Mental Status: She is alert.   Psychiatric:         Behavior: Behavior normal.         Procedures     Assessment & Plan   Diagnoses and all orders for this visit:    1. Generalized anxiety disorder (Primary)  Assessment & Plan:  Doing small doses of THC gummies as needed with help.       2. Other fatigue  Assessment & Plan:  Multifactorial with h/o anemia, low D, ZHENG, but also working and going to school.     Orders:  -     TSH  -     Vitamin B12  -     CMV IgG IgM  -     EBV Antibody Profile    3. ZHENG (obstructive sleep apnea)  Assessment & " Plan:  On cpap      4. Class 3 severe obesity due to excess calories with serious comorbidity and body mass index (BMI) of 50.0 to 59.9 in adult  Assessment & Plan:  Patient's (Body mass index is 52.09 kg/m².) indicates that they are obese (BMI >30) with health conditions that include hypertension . Weight is unchanged. BMI  is above average; BMI management plan is completed. We discussed portion control and increasing exercise.       5. Essential hypertension  -     Comprehensive Metabolic Panel  -     CBC & Differential    6. Paresthesia and pain of both upper extremities  -     Comprehensive Metabolic Panel  -     CBC & Differential  -     TSH  -     Vitamin B12    7. Localized swelling of left foot  Assessment & Plan:  Suspect related to prior surgeries but could be nifedipine too.       8. Microcytosis  -     CBC & Differential  -     Iron Profile  -     Ferritin    9. Hyperglycemia  -     Hemoglobin A1c    10. Vitamin D deficiency  -     Vitamin D,25-Hydroxy         Discussion    Patient Instructions   I have ordered lab tests today.  You should receive a phone call or a [employer] message with those results.  If you have not heard from us in 7-10 days, please call the office.      We talked about compression stockings if needed for the swelling.               Hansa Daniels MD

## 2024-03-01 NOTE — ASSESSMENT & PLAN NOTE
Patient's (Body mass index is 52.09 kg/m².) indicates that they are obese (BMI >30) with health conditions that include hypertension . Weight is unchanged. BMI  is above average; BMI management plan is completed. We discussed portion control and increasing exercise.

## 2024-03-04 ENCOUNTER — PATIENT MESSAGE (OUTPATIENT)
Dept: FAMILY MEDICINE CLINIC | Facility: CLINIC | Age: 34
End: 2024-03-04
Payer: MEDICAID

## 2024-03-04 LAB
25(OH)D3+25(OH)D2 SERPL-MCNC: 20.2 NG/ML (ref 30–100)
ALBUMIN SERPL-MCNC: 4.5 G/DL (ref 3.5–5.2)
ALBUMIN/GLOB SERPL: 1.8 G/DL
ALP SERPL-CCNC: 93 U/L (ref 39–117)
ALT SERPL-CCNC: 17 U/L (ref 1–33)
AST SERPL-CCNC: 14 U/L (ref 1–32)
BASOPHILS # BLD AUTO: 0.02 10*3/MM3 (ref 0–0.2)
BASOPHILS NFR BLD AUTO: 0.2 % (ref 0–1.5)
BILIRUB SERPL-MCNC: 0.3 MG/DL (ref 0–1.2)
BUN SERPL-MCNC: 9 MG/DL (ref 6–20)
BUN/CREAT SERPL: 11.5 (ref 7–25)
CALCIUM SERPL-MCNC: 9.4 MG/DL (ref 8.6–10.5)
CHLORIDE SERPL-SCNC: 102 MMOL/L (ref 98–107)
CMV IGG SERPL IA-ACNC: >10 U/ML (ref 0–0.59)
CMV IGM SERPL IA-ACNC: <30 AU/ML (ref 0–29.9)
CO2 SERPL-SCNC: 18.6 MMOL/L (ref 22–29)
CREAT SERPL-MCNC: 0.78 MG/DL (ref 0.57–1)
EBV NA IGG SER IA-ACNC: 57.3 U/ML (ref 0–17.9)
EBV VCA IGG SER IA-ACNC: 531 U/ML (ref 0–17.9)
EBV VCA IGM SER IA-ACNC: <36 U/ML (ref 0–35.9)
EGFRCR SERPLBLD CKD-EPI 2021: 103.6 ML/MIN/1.73
EOSINOPHIL # BLD AUTO: 0.09 10*3/MM3 (ref 0–0.4)
EOSINOPHIL NFR BLD AUTO: 1 % (ref 0.3–6.2)
ERYTHROCYTE [DISTWIDTH] IN BLOOD BY AUTOMATED COUNT: 13.3 % (ref 12.3–15.4)
FERRITIN SERPL-MCNC: 66.1 NG/ML (ref 13–150)
GLOBULIN SER CALC-MCNC: 2.5 GM/DL
GLUCOSE SERPL-MCNC: 106 MG/DL (ref 65–99)
HBA1C MFR BLD: 5.8 % (ref 4.8–5.6)
HCT VFR BLD AUTO: 41 % (ref 34–46.6)
HGB BLD-MCNC: 13.6 G/DL (ref 12–15.9)
IMM GRANULOCYTES # BLD AUTO: 0.03 10*3/MM3 (ref 0–0.05)
IMM GRANULOCYTES NFR BLD AUTO: 0.3 % (ref 0–0.5)
IRON SATN MFR SERPL: 12 % (ref 20–50)
IRON SERPL-MCNC: 51 MCG/DL (ref 37–145)
LYMPHOCYTES # BLD AUTO: 1.74 10*3/MM3 (ref 0.7–3.1)
LYMPHOCYTES NFR BLD AUTO: 20 % (ref 19.6–45.3)
MCH RBC QN AUTO: 26.3 PG (ref 26.6–33)
MCHC RBC AUTO-ENTMCNC: 33.2 G/DL (ref 31.5–35.7)
MCV RBC AUTO: 79.3 FL (ref 79–97)
MONOCYTES # BLD AUTO: 0.29 10*3/MM3 (ref 0.1–0.9)
MONOCYTES NFR BLD AUTO: 3.3 % (ref 5–12)
NEUTROPHILS # BLD AUTO: 6.54 10*3/MM3 (ref 1.7–7)
NEUTROPHILS NFR BLD AUTO: 75.2 % (ref 42.7–76)
NRBC BLD AUTO-RTO: 0 /100 WBC (ref 0–0.2)
PLATELET # BLD AUTO: 412 10*3/MM3 (ref 140–450)
POTASSIUM SERPL-SCNC: 3.8 MMOL/L (ref 3.5–5.2)
PROT SERPL-MCNC: 7 G/DL (ref 6–8.5)
RBC # BLD AUTO: 5.17 10*6/MM3 (ref 3.77–5.28)
SERVICE CMNT-IMP: ABNORMAL
SODIUM SERPL-SCNC: 139 MMOL/L (ref 136–145)
TIBC SERPL-MCNC: 420 MCG/DL
TSH SERPL DL<=0.005 MIU/L-ACNC: 2.6 UIU/ML (ref 0.27–4.2)
UIBC SERPL-MCNC: 369 MCG/DL (ref 112–346)
VIT B12 SERPL-MCNC: 468 PG/ML (ref 211–946)
WBC # BLD AUTO: 8.71 10*3/MM3 (ref 3.4–10.8)

## 2024-03-05 NOTE — TELEPHONE ENCOUNTER
From: Carolina Frias  To: Hansa Daniels  Sent: 3/4/2024 3:34 PM EST  Subject: Lab results     I was just following up to see if the lab tests came back yet.

## 2024-05-28 ENCOUNTER — HOSPITAL ENCOUNTER (EMERGENCY)
Facility: HOSPITAL | Age: 34
Discharge: HOME | End: 2024-05-28
Attending: EMERGENCY MEDICINE | Admitting: EMERGENCY MEDICINE
Payer: MEDICAID

## 2024-05-28 ENCOUNTER — APPOINTMENT (OUTPATIENT)
Dept: GENERAL RADIOLOGY | Facility: HOSPITAL | Age: 34
End: 2024-05-28
Payer: MEDICAID

## 2024-05-28 ENCOUNTER — TELEPHONE (OUTPATIENT)
Dept: FAMILY MEDICINE CLINIC | Facility: CLINIC | Age: 34
End: 2024-05-28

## 2024-05-28 VITALS
SYSTOLIC BLOOD PRESSURE: 144 MMHG | WEIGHT: 280 LBS | HEIGHT: 62 IN | HEART RATE: 66 BPM | OXYGEN SATURATION: 99 % | RESPIRATION RATE: 18 BRPM | BODY MASS INDEX: 51.53 KG/M2 | TEMPERATURE: 98.4 F | DIASTOLIC BLOOD PRESSURE: 80 MMHG

## 2024-05-28 DIAGNOSIS — R07.89 ATYPICAL CHEST PAIN: ICD-10-CM

## 2024-05-28 DIAGNOSIS — K29.00 ACUTE SUPERFICIAL GASTRITIS WITHOUT HEMORRHAGE: Primary | ICD-10-CM

## 2024-05-28 LAB
ALBUMIN SERPL-MCNC: 4.5 G/DL (ref 3.5–5.2)
ALBUMIN/GLOB SERPL: 1.4 G/DL
ALP SERPL-CCNC: 89 U/L (ref 39–117)
ALT SERPL W P-5'-P-CCNC: 15 U/L (ref 1–33)
ANION GAP SERPL CALCULATED.3IONS-SCNC: 11.7 MMOL/L (ref 5–15)
AST SERPL-CCNC: 18 U/L (ref 1–32)
BASOPHILS # BLD AUTO: 0.04 10*3/MM3 (ref 0–0.2)
BASOPHILS NFR BLD AUTO: 0.4 % (ref 0–1.5)
BILIRUB SERPL-MCNC: 0.3 MG/DL (ref 0–1.2)
BILIRUB UR QL STRIP: NEGATIVE
BUN SERPL-MCNC: 11 MG/DL (ref 6–20)
BUN/CREAT SERPL: 16.7 (ref 7–25)
CALCIUM SPEC-SCNC: 9.3 MG/DL (ref 8.6–10.5)
CHLORIDE SERPL-SCNC: 101 MMOL/L (ref 98–107)
CLARITY UR: CLEAR
CO2 SERPL-SCNC: 22.3 MMOL/L (ref 22–29)
COLOR UR: YELLOW
CREAT SERPL-MCNC: 0.66 MG/DL (ref 0.57–1)
D DIMER PPP FEU-MCNC: 0.28 MCGFEU/ML (ref 0–0.5)
DEPRECATED RDW RBC AUTO: 37.9 FL (ref 37–54)
EGFRCR SERPLBLD CKD-EPI 2021: 119.7 ML/MIN/1.73
EOSINOPHIL # BLD AUTO: 0.05 10*3/MM3 (ref 0–0.4)
EOSINOPHIL NFR BLD AUTO: 0.5 % (ref 0.3–6.2)
ERYTHROCYTE [DISTWIDTH] IN BLOOD BY AUTOMATED COUNT: 12.5 % (ref 12.3–15.4)
GEN 5 2HR TROPONIN T REFLEX: <6 NG/L
GLOBULIN UR ELPH-MCNC: 3.2 GM/DL
GLUCOSE SERPL-MCNC: 95 MG/DL (ref 65–99)
GLUCOSE UR STRIP-MCNC: NEGATIVE MG/DL
HCT VFR BLD AUTO: 42.3 % (ref 34–46.6)
HGB BLD-MCNC: 13.5 G/DL (ref 12–15.9)
HGB UR QL STRIP.AUTO: NEGATIVE
HOLD SPECIMEN: NORMAL
HOLD SPECIMEN: NORMAL
IMM GRANULOCYTES # BLD AUTO: 0.04 10*3/MM3 (ref 0–0.05)
IMM GRANULOCYTES NFR BLD AUTO: 0.4 % (ref 0–0.5)
KETONES UR QL STRIP: NEGATIVE
LEUKOCYTE ESTERASE UR QL STRIP.AUTO: NEGATIVE
LYMPHOCYTES # BLD AUTO: 2.46 10*3/MM3 (ref 0.7–3.1)
LYMPHOCYTES NFR BLD AUTO: 25.9 % (ref 19.6–45.3)
MCH RBC QN AUTO: 26.3 PG (ref 26.6–33)
MCHC RBC AUTO-ENTMCNC: 31.9 G/DL (ref 31.5–35.7)
MCV RBC AUTO: 82.5 FL (ref 79–97)
MONOCYTES # BLD AUTO: 0.32 10*3/MM3 (ref 0.1–0.9)
MONOCYTES NFR BLD AUTO: 3.4 % (ref 5–12)
NEUTROPHILS NFR BLD AUTO: 6.59 10*3/MM3 (ref 1.7–7)
NEUTROPHILS NFR BLD AUTO: 69.4 % (ref 42.7–76)
NITRITE UR QL STRIP: NEGATIVE
NRBC BLD AUTO-RTO: 0 /100 WBC (ref 0–0.2)
NT-PROBNP SERPL-MCNC: 62.3 PG/ML (ref 0–450)
PH UR STRIP.AUTO: 6 [PH] (ref 4.5–8)
PLATELET # BLD AUTO: 362 10*3/MM3 (ref 140–450)
PMV BLD AUTO: 8.2 FL (ref 6–12)
POTASSIUM SERPL-SCNC: 3.7 MMOL/L (ref 3.5–5.2)
PROT SERPL-MCNC: 7.7 G/DL (ref 6–8.5)
PROT UR QL STRIP: NEGATIVE
QT INTERVAL: 359 MS
QTC INTERVAL: 442 MS
RBC # BLD AUTO: 5.13 10*6/MM3 (ref 3.77–5.28)
SODIUM SERPL-SCNC: 135 MMOL/L (ref 136–145)
SP GR UR STRIP: 1.02 (ref 1–1.03)
TROPONIN T DELTA: NORMAL
TROPONIN T SERPL HS-MCNC: <6 NG/L
UROBILINOGEN UR QL STRIP: NORMAL
WBC NRBC COR # BLD AUTO: 9.5 10*3/MM3 (ref 3.4–10.8)
WHOLE BLOOD HOLD COAG: NORMAL
WHOLE BLOOD HOLD SPECIMEN: NORMAL

## 2024-05-28 PROCEDURE — 85025 COMPLETE CBC W/AUTO DIFF WBC: CPT | Performed by: EMERGENCY MEDICINE

## 2024-05-28 PROCEDURE — 81003 URINALYSIS AUTO W/O SCOPE: CPT | Performed by: EMERGENCY MEDICINE

## 2024-05-28 PROCEDURE — 93005 ELECTROCARDIOGRAM TRACING: CPT | Performed by: EMERGENCY MEDICINE

## 2024-05-28 PROCEDURE — 96374 THER/PROPH/DIAG INJ IV PUSH: CPT

## 2024-05-28 PROCEDURE — 25810000003 SODIUM CHLORIDE 0.9 % SOLUTION: Performed by: EMERGENCY MEDICINE

## 2024-05-28 PROCEDURE — 96361 HYDRATE IV INFUSION ADD-ON: CPT

## 2024-05-28 PROCEDURE — 25010000002 ONDANSETRON PER 1 MG: Performed by: EMERGENCY MEDICINE

## 2024-05-28 PROCEDURE — 93010 ELECTROCARDIOGRAM REPORT: CPT | Performed by: INTERNAL MEDICINE

## 2024-05-28 PROCEDURE — 83880 ASSAY OF NATRIURETIC PEPTIDE: CPT | Performed by: EMERGENCY MEDICINE

## 2024-05-28 PROCEDURE — 99284 EMERGENCY DEPT VISIT MOD MDM: CPT

## 2024-05-28 PROCEDURE — 71046 X-RAY EXAM CHEST 2 VIEWS: CPT

## 2024-05-28 PROCEDURE — 85379 FIBRIN DEGRADATION QUANT: CPT | Performed by: EMERGENCY MEDICINE

## 2024-05-28 PROCEDURE — 84484 ASSAY OF TROPONIN QUANT: CPT | Performed by: EMERGENCY MEDICINE

## 2024-05-28 PROCEDURE — 36415 COLL VENOUS BLD VENIPUNCTURE: CPT

## 2024-05-28 PROCEDURE — 80053 COMPREHEN METABOLIC PANEL: CPT | Performed by: EMERGENCY MEDICINE

## 2024-05-28 RX ORDER — SODIUM CHLORIDE 0.9 % (FLUSH) 0.9 %
10 SYRINGE (ML) INJECTION AS NEEDED
Status: DISCONTINUED | OUTPATIENT
Start: 2024-05-28 | End: 2024-05-28 | Stop reason: HOSPADM

## 2024-05-28 RX ORDER — ONDANSETRON 2 MG/ML
8 INJECTION INTRAMUSCULAR; INTRAVENOUS ONCE
Status: COMPLETED | OUTPATIENT
Start: 2024-05-28 | End: 2024-05-28

## 2024-05-28 RX ORDER — FAMOTIDINE 20 MG/1
20 TABLET, FILM COATED ORAL 2 TIMES DAILY
Qty: 30 TABLET | Refills: 0 | Status: SHIPPED | OUTPATIENT
Start: 2024-05-28

## 2024-05-28 RX ORDER — ONDANSETRON HYDROCHLORIDE 8 MG/1
8 TABLET, FILM COATED ORAL EVERY 8 HOURS PRN
Qty: 10 TABLET | Refills: 0 | Status: SHIPPED | OUTPATIENT
Start: 2024-05-28

## 2024-05-28 RX ADMIN — ONDANSETRON 8 MG: 2 INJECTION INTRAMUSCULAR; INTRAVENOUS at 13:40

## 2024-05-28 RX ADMIN — SODIUM CHLORIDE 1000 ML: 9 INJECTION, SOLUTION INTRAVENOUS at 13:40

## 2024-05-28 NOTE — ED PROVIDER NOTES
"Subjective   History of Present Illness    Chief complaint: Chest pain    Location: Anterior    Quality/Severity: Moderate    Timing/Onset/Duration: 2 days ago, constant    Modifying Factors: Nothing makes it better or worse    Associated Symptoms: No headache.  No fever or chills or cough.  No sore throat or earache.  Patient's had some nasal congestion.  No shortness of breath.  No abdominal pain.  No diarrhea or burning when she urinates.  Patient's had nausea and vomiting.  Patient also complains of some dizziness.    Narrative: This 33-year-old presents with chest pain and dizziness for the last 2 to 3 days.  Patient has had nausea that started today.  The patient had a normal Holter monitor study 2018.    PCP:Hansa Daniels MD      Review of Systems   Constitutional:  Negative for chills and fever.   HENT:  Positive for congestion and rhinorrhea. Negative for ear pain and sore throat.    Respiratory:  Negative for cough and shortness of breath.    Cardiovascular:  Positive for chest pain.   Gastrointestinal:  Positive for nausea. Negative for abdominal pain and vomiting.   Genitourinary:  Negative for difficulty urinating.   Musculoskeletal:  Negative for back pain.   Skin:  Negative for rash.   Neurological:  Negative for headaches.         Past Medical History:   Diagnosis Date    Allergic     ASCUS of cervix with negative high risk HPV 2014    Atypical squamous cells of undetermined significance (ASCUS) on Papanicolaou smear of cervix 2012    NO HPV DONE    Bipolar depression     Cervical high risk HPV (human papillomavirus) test positive 2015    HPV 16    HTN in pregnancy, chronic 2021    Kidney stones     LGSIL on Pap smear of cervix 2011    Migraine     SAB (spontaneous ) 2006    SAB (spontaneous ) 2007       Allergies   Allergen Reactions    Lamotrigine Hives and Other (See Comments)     Also states that she \"cannot walk.\"    Lactose " Nausea Only    Amoxicillin Hives    Gabapentin Other (See Comments)     Pt prefers not to take this drug    Sertraline Hives       Past Surgical History:   Procedure Laterality Date     SECTION      x 2     COLPOSCOPY  2011    NO PATH SENT    COLPOSCOPY W/ BIOPSY / CURETTAGE  2014    LGSIL    COLPOSCOPY W/ BIOPSY / CURETTAGE  2012    CHRONIC CERVICITIS AND SQUAMOUS METAPLASIA    COLPOSCOPY W/ BIOPSY / CURETTAGE  2015    NEGATIVE    HYSTERECTOMY      TONSILLECTOMY      TUBAL ABDOMINAL LIGATION         Family History   Problem Relation Age of Onset    Atrial fibrillation Father     Diabetes type I Son     Other Paternal Aunt         Removed pre-cancer lesion in breast and ovary- CHk2+- age 43    Ovarian cancer Maternal Grandmother     Breast cancer Paternal Grandmother         estrogen receptor positive    Ovarian cancer Paternal Great-Grandmother        Social History     Socioeconomic History    Marital status: Single   Tobacco Use    Smoking status: Every Day     Current packs/day: 0.50     Average packs/day: 0.5 packs/day for 10.0 years (5.0 ttl pk-yrs)     Types: Cigarettes, Electronic Cigarette    Smokeless tobacco: Never    Tobacco comments:     cigarettes and e-cigarettes    Vaping Use    Vaping status: Some Days   Substance and Sexual Activity    Alcohol use: No     Comment: rare    Drug use: Yes     Types: Cocaine(coke), Hydrocodone, Marijuana, Oxycodone     Comment: cbd gummies    Sexual activity: Yes     Partners: Male     Birth control/protection: None, Tubal ligation, Hysterectomy, Same-sex partner           Objective   Physical Exam  Vitals (The blood pressure is 151/85, temperature is 98.4 °F, pulse 91, respirations 18, room air pulse ox 98%.) and nursing note reviewed.   Constitutional:       Appearance: She is well-developed.   HENT:      Head: Normocephalic and atraumatic.   Cardiovascular:      Rate and Rhythm: Normal rate and regular rhythm.      Heart sounds: No  murmur heard.     No friction rub. No gallop.   Pulmonary:      Effort: Pulmonary effort is normal.      Breath sounds: Normal breath sounds.   Chest:      Chest wall: No tenderness.   Abdominal:      Palpations: Abdomen is soft. There is no mass.      Tenderness: There is no abdominal tenderness. There is no guarding or rebound.   Musculoskeletal:      Cervical back: Normal range of motion and neck supple.      Right lower leg: No edema.      Left lower leg: No edema.   Neurological:      General: No focal deficit present.      Mental Status: She is alert and oriented to person, place, and time.         Procedures           ED Course  ED Course as of 05/28/24 1605   Tue May 28, 2024   1323 ECG 12 Lead Chest Pain [RC]   1332 The CBC is unremarkable. [RC]   1420 The urinalysis is unremarkable. [RC]   1420 The high-sensitivity troponin is less than 6 and normal [RC]   1420 The proBNP is 62 and normal. [RC]   1420 The sodium is 135, CMP is otherwise unremarkable. [RC]   1421 D-dimer is 0.28 and normal. [RC]   1559 Repeat troponin at the 2-hour mahsa is less than 6 with a delta T of 0 [RC]      ED Course User Index  [RC] Jeremiah Lange MD    13:25 EDT, 05/28/24:  The EKG was obtained at 1254 and read by me at 1257 the EKG shows a normal sinus rhythm with rate of 91.  There is a normal axis with no hypertrophy.  The CT, QRS, QT intervals are unremarkable.  There is no ectopy.  There is no acute ST elevation or depression.                             16:06 EDT, 05/28/24:  Patient was reassessed.  She has no new complaints.  Her vital signs reviewed and are stable.    16:06 EDT, 05/28/24:  Patient's diagnosis of nausea and atypical chest pain was discussed with her.  The patient will be given a prescription for Zofran.  She will be given prescription for Pepcid.  The patient should drink clear liquids for 24 to 48 hours, then advance diet as tolerated.  The patient should follow-up with Dr. Ash in 2 days.  She  should return to the emergency department if there is worsening pain, vomiting not controlled with Zofran, worse anyway at all.  All the patient questions were answered she will be discharged in good condition.            Medical Decision Making  Amount and/or Complexity of Data Reviewed  Labs: ordered.  Radiology: ordered.  ECG/medicine tests: ordered.    Risk  Prescription drug management.        Final diagnoses:   None       ED Disposition  ED Disposition       None            No follow-up provider specified.       Medication List      No changes were made to your prescriptions during this visit.            Jeremiah Lange MD  05/28/24 0917

## 2024-05-28 NOTE — TELEPHONE ENCOUNTER
Caller: Carolina Frias    Relationship to patient: Self    Best call back number: [phone]     Chief complaint: CHEST PAIN, NAUSEA, VOMITING, ABOUT TO PASS OUT    Patient directed to call 911 or go to their nearest emergency room.     Patient verbalized understanding: [x] Yes  [] No  If no, why?    Additional notes:    STATES SHE WILL BE GOING TO [facility] ER

## 2024-05-28 NOTE — DISCHARGE INSTRUCTIONS
Clear liquids for 24 to 48 hours, then advance diet as tolerated.  Take the Pepcid as prescribed.  Zofran as needed as prescribed for nausea and vomiting.  Follow-up with Dr. Ash in 2 days.  Return to the emergency department if there is vomiting not controlled with Zofran, worsening chest pain, worse anyway at all.

## 2024-09-03 ENCOUNTER — OFFICE VISIT (OUTPATIENT)
Dept: FAMILY MEDICINE CLINIC | Facility: CLINIC | Age: 34
End: 2024-09-03
Payer: MEDICAID

## 2024-09-03 VITALS
DIASTOLIC BLOOD PRESSURE: 92 MMHG | OXYGEN SATURATION: 98 % | BODY MASS INDEX: 52.68 KG/M2 | HEART RATE: 98 BPM | TEMPERATURE: 97.8 F | WEIGHT: 286.3 LBS | SYSTOLIC BLOOD PRESSURE: 141 MMHG | RESPIRATION RATE: 16 BRPM | HEIGHT: 62 IN

## 2024-09-03 DIAGNOSIS — F19.11 HISTORY OF SUBSTANCE ABUSE: ICD-10-CM

## 2024-09-03 DIAGNOSIS — F43.10 PTSD (POST-TRAUMATIC STRESS DISORDER): ICD-10-CM

## 2024-09-03 DIAGNOSIS — D50.0 ANEMIA DUE TO BLOOD LOSS: ICD-10-CM

## 2024-09-03 DIAGNOSIS — G47.33 OSA (OBSTRUCTIVE SLEEP APNEA): ICD-10-CM

## 2024-09-03 DIAGNOSIS — F41.1 GENERALIZED ANXIETY DISORDER: ICD-10-CM

## 2024-09-03 DIAGNOSIS — F17.200 NICOTINE DEPENDENCE DUE TO VAPING NON-TOBACCO PRODUCT: ICD-10-CM

## 2024-09-03 DIAGNOSIS — G43.009 MIGRAINE WITHOUT AURA AND WITHOUT STATUS MIGRAINOSUS, NOT INTRACTABLE: ICD-10-CM

## 2024-09-03 DIAGNOSIS — I10 ESSENTIAL HYPERTENSION: Primary | ICD-10-CM

## 2024-09-03 DIAGNOSIS — E28.2 PCOS (POLYCYSTIC OVARIAN SYNDROME): ICD-10-CM

## 2024-09-03 DIAGNOSIS — E55.9 VITAMIN D DEFICIENCY: ICD-10-CM

## 2024-09-03 DIAGNOSIS — R73.9 HYPERGLYCEMIA: ICD-10-CM

## 2024-09-03 PROBLEM — Z72.0 CURRENT NICOTINE USE: Status: ACTIVE | Noted: 2024-09-03

## 2024-09-03 PROCEDURE — 3080F DIAST BP >= 90 MM HG: CPT | Performed by: FAMILY MEDICINE

## 2024-09-03 PROCEDURE — 1160F RVW MEDS BY RX/DR IN RCRD: CPT | Performed by: FAMILY MEDICINE

## 2024-09-03 PROCEDURE — 99214 OFFICE O/P EST MOD 30 MIN: CPT | Performed by: FAMILY MEDICINE

## 2024-09-03 PROCEDURE — 1159F MED LIST DOCD IN RCRD: CPT | Performed by: FAMILY MEDICINE

## 2024-09-03 PROCEDURE — 3077F SYST BP >= 140 MM HG: CPT | Performed by: FAMILY MEDICINE

## 2024-09-03 RX ORDER — RIZATRIPTAN BENZOATE 10 MG/1
10 TABLET ORAL ONCE AS NEEDED
Qty: 9 TABLET | Refills: 5 | Status: SHIPPED | OUTPATIENT
Start: 2024-09-03

## 2024-09-03 RX ORDER — HYDROCHLOROTHIAZIDE 12.5 MG/1
12.5 TABLET ORAL DAILY
Qty: 90 TABLET | Refills: 1 | Status: SHIPPED | OUTPATIENT
Start: 2024-09-03

## 2024-09-03 RX ORDER — VARENICLINE TARTRATE 1 MG/1
1 TABLET, FILM COATED ORAL 2 TIMES DAILY
Qty: 60 TABLET | Refills: 3 | Status: SHIPPED | OUTPATIENT
Start: 2024-10-01

## 2024-09-03 RX ORDER — VARENICLINE TARTRATE 0.5 (11)-1
KIT ORAL
Qty: 1 EACH | Refills: 0 | Status: SHIPPED | OUTPATIENT
Start: 2024-09-03 | End: 2024-10-01

## 2024-09-03 NOTE — PROGRESS NOTES
"Subjective     Carolina Frias is a 34 y.o. female who presents with   Chief Complaint   Patient presents with    Anxiety       Anxiety       She stopped taking nifedipine for her blood pressure due to swelling and wondering about a different option.    She finds if she stops taking iron, her whole body feels drained and weak and she hurts.  She struggles to take medication and wondering about iron infusions.  She's had a hysterectomy and not bleeding anymore but iron was low even afterwards.    She's asking for medication to help with weight loss and to get off the vape.  She also has PCOS and does still have her ovaries. The vape has nicotine and she uses THC gummies for anxiety.  She reports she doesn't do well with serotonin products and took Wellbutrin in the past too.   She is legally obtaining the THC gummies in [place].     ZHENG on CPAP     PCOS and obesity, she still has her ovaries.  She does have the CHEK2.    Prior A1C mildly elevated.               Review of Systems     Objective     /92   Pulse 98   Temp 97.8 °F (36.6 °C)   Resp 16   Ht 157.5 cm (62\")   Wt 130 kg (286 lb 4.8 oz)   LMP 05/09/2023 (Exact Date)   SpO2 98%   BMI 52.36 kg/m²     Physical Exam  Constitutional:       Appearance: Normal appearance.   Cardiovascular:      Rate and Rhythm: Normal rate and regular rhythm.      Heart sounds: Normal heart sounds.   Pulmonary:      Effort: Pulmonary effort is normal.      Breath sounds: Normal breath sounds.   Neurological:      Mental Status: She is alert.   Psychiatric:         Behavior: Behavior normal.         Procedures     Assessment & Plan   Diagnoses and all orders for this visit:    1. Essential hypertension (Primary)  -     hydroCHLOROthiazide 12.5 MG tablet; Take 1 tablet by mouth Daily.  Dispense: 90 tablet; Refill: 1  -     Comprehensive Metabolic Panel    2. Anemia due to blood loss  -     CBC & Differential  -     Iron Profile    3. Generalized anxiety " disorder  Assessment & Plan:  Using low doses of THC gummies      4. Migraine without aura and without status migrainosus, not intractable  -     rizatriptan (Maxalt) 10 MG tablet; Take 1 tablet by mouth 1 (One) Time As Needed for Migraine. May repeat in 2 hours if needed  Dispense: 9 tablet; Refill: 5    5. ZHENG (obstructive sleep apnea)    6. Hyperglycemia  -     Hemoglobin A1c    7. Nicotine dependence due to vaping non-tobacco product  -     Varenicline Tartrate, Starter, 0.5 MG X 11 & 1 MG X 42 tablet therapy pack; Take 0.5 mg by mouth Daily for 3 days, THEN 0.5 mg 2 (Two) Times a Day for 4 days, THEN 1 mg 2 (Two) Times a Day for 21 days. Take 0.5 mg po daily x 3 days, then 0.5 mg po bid x 4 days, then 1 mg po bid  Dispense: 1 each; Refill: 0  -     varenicline (Chantix Continuing Month Joey) 1 MG tablet; Take 1 tablet by mouth 2 (Two) Times a Day.  Dispense: 60 tablet; Refill: 3    8. Vitamin D deficiency  -     Vitamin D,25-Hydroxy    9. PCOS (polycystic ovarian syndrome)    10. PTSD (post-traumatic stress disorder)  Assessment & Plan:  Using THC edibles      11. History of substance abuse  Assessment & Plan:  No cocaine or narcotics for over 11 years               Discussion    Patient Instructions   I have ordered lab tests today.  You should receive a phone call or a HOLL[employer] message with those results.  If you have not heard from us in 7-10 days, please call the office.    If your iron level is still low we will get you to a hematologist to talk about options.  The IV iron has more risk with it but if you are not taking the pills we may need it.    We added hydrochlorothiazide for blood pressure and swelling.    We talked about stopping vaping and since you've done poorly with antidepressants, we decided to try Chantix.                    Hansa Daniels MD

## 2024-09-03 NOTE — PATIENT INSTRUCTIONS
I have ordered lab tests today.  You should receive a phone call or a [employer] message with those results.  If you have not heard from us in 7-10 days, please call the office.    If your iron level is still low we will get you to a hematologist to talk about options.  The IV iron has more risk with it but if you are not taking the pills we may need it.    We added hydrochlorothiazide for blood pressure and swelling.    We talked about stopping vaping and since you've done poorly with antidepressants, we decided to try Chantix.

## 2024-09-04 LAB
25(OH)D3+25(OH)D2 SERPL-MCNC: 23.4 NG/ML (ref 30–100)
ALBUMIN SERPL-MCNC: 4.4 G/DL (ref 3.5–5.2)
ALBUMIN/GLOB SERPL: 1.5 G/DL
ALP SERPL-CCNC: 91 U/L (ref 39–117)
ALT SERPL-CCNC: 26 U/L (ref 1–33)
AST SERPL-CCNC: 21 U/L (ref 1–32)
BASOPHILS # BLD AUTO: 0.03 10*3/MM3 (ref 0–0.2)
BASOPHILS NFR BLD AUTO: 0.4 % (ref 0–1.5)
BILIRUB SERPL-MCNC: 0.5 MG/DL (ref 0–1.2)
BUN SERPL-MCNC: 8 MG/DL (ref 6–20)
BUN/CREAT SERPL: 10.5 (ref 7–25)
CALCIUM SERPL-MCNC: 9.1 MG/DL (ref 8.6–10.5)
CHLORIDE SERPL-SCNC: 99 MMOL/L (ref 98–107)
CO2 SERPL-SCNC: 26.1 MMOL/L (ref 22–29)
CREAT SERPL-MCNC: 0.76 MG/DL (ref 0.57–1)
EGFRCR SERPLBLD CKD-EPI 2021: 106.3 ML/MIN/1.73
EOSINOPHIL # BLD AUTO: 0.1 10*3/MM3 (ref 0–0.4)
EOSINOPHIL NFR BLD AUTO: 1.4 % (ref 0.3–6.2)
ERYTHROCYTE [DISTWIDTH] IN BLOOD BY AUTOMATED COUNT: 13.4 % (ref 12.3–15.4)
GLOBULIN SER CALC-MCNC: 3 GM/DL
GLUCOSE SERPL-MCNC: 93 MG/DL (ref 65–99)
HBA1C MFR BLD: 5.5 % (ref 4.8–5.6)
HCT VFR BLD AUTO: 40.2 % (ref 34–46.6)
HGB BLD-MCNC: 13.5 G/DL (ref 12–15.9)
IMM GRANULOCYTES # BLD AUTO: 0.02 10*3/MM3 (ref 0–0.05)
IMM GRANULOCYTES NFR BLD AUTO: 0.3 % (ref 0–0.5)
IRON SATN MFR SERPL: 40 % (ref 20–50)
IRON SERPL-MCNC: 154 MCG/DL (ref 37–145)
LYMPHOCYTES # BLD AUTO: 1.78 10*3/MM3 (ref 0.7–3.1)
LYMPHOCYTES NFR BLD AUTO: 24.9 % (ref 19.6–45.3)
MCH RBC QN AUTO: 26.8 PG (ref 26.6–33)
MCHC RBC AUTO-ENTMCNC: 33.6 G/DL (ref 31.5–35.7)
MCV RBC AUTO: 79.9 FL (ref 79–97)
MONOCYTES # BLD AUTO: 0.23 10*3/MM3 (ref 0.1–0.9)
MONOCYTES NFR BLD AUTO: 3.2 % (ref 5–12)
NEUTROPHILS # BLD AUTO: 5 10*3/MM3 (ref 1.7–7)
NEUTROPHILS NFR BLD AUTO: 69.8 % (ref 42.7–76)
NRBC BLD AUTO-RTO: 0 /100 WBC (ref 0–0.2)
PLATELET # BLD AUTO: 369 10*3/MM3 (ref 140–450)
POTASSIUM SERPL-SCNC: 4 MMOL/L (ref 3.5–5.2)
PROT SERPL-MCNC: 7.4 G/DL (ref 6–8.5)
RBC # BLD AUTO: 5.03 10*6/MM3 (ref 3.77–5.28)
SODIUM SERPL-SCNC: 138 MMOL/L (ref 136–145)
TIBC SERPL-MCNC: 387 MCG/DL
UIBC SERPL-MCNC: 233 MCG/DL (ref 112–346)
WBC # BLD AUTO: 7.16 10*3/MM3 (ref 3.4–10.8)

## 2024-10-07 ENCOUNTER — APPOINTMENT (OUTPATIENT)
Dept: CT IMAGING | Facility: HOSPITAL | Age: 34
End: 2024-10-07
Payer: MEDICAID

## 2024-10-07 ENCOUNTER — HOSPITAL ENCOUNTER (EMERGENCY)
Facility: HOSPITAL | Age: 34
Discharge: HOME | End: 2024-10-07
Attending: INTERNAL MEDICINE | Admitting: INTERNAL MEDICINE
Payer: MEDICAID

## 2024-10-07 VITALS
WEIGHT: 286.6 LBS | HEIGHT: 62 IN | DIASTOLIC BLOOD PRESSURE: 62 MMHG | TEMPERATURE: 98.2 F | SYSTOLIC BLOOD PRESSURE: 116 MMHG | BODY MASS INDEX: 52.74 KG/M2 | OXYGEN SATURATION: 98 % | RESPIRATION RATE: 16 BRPM | HEART RATE: 82 BPM

## 2024-10-07 DIAGNOSIS — N83.201 OVARIAN CYST, RIGHT: Primary | ICD-10-CM

## 2024-10-07 LAB
ALBUMIN SERPL-MCNC: 4.2 G/DL (ref 3.5–5.2)
ALBUMIN/GLOB SERPL: 1.2 G/DL
ALP SERPL-CCNC: 85 U/L (ref 39–117)
ALT SERPL W P-5'-P-CCNC: 19 U/L (ref 1–33)
ANION GAP SERPL CALCULATED.3IONS-SCNC: 12.2 MMOL/L (ref 5–15)
AST SERPL-CCNC: 19 U/L (ref 1–32)
BASOPHILS # BLD AUTO: 0.04 10*3/MM3 (ref 0–0.2)
BASOPHILS NFR BLD AUTO: 0.4 % (ref 0–1.5)
BILIRUB SERPL-MCNC: 0.3 MG/DL (ref 0–1.2)
BILIRUB UR QL STRIP: NEGATIVE
BUN SERPL-MCNC: 10 MG/DL (ref 6–20)
BUN/CREAT SERPL: 15.9 (ref 7–25)
CALCIUM SPEC-SCNC: 9.1 MG/DL (ref 8.6–10.5)
CHLORIDE SERPL-SCNC: 102 MMOL/L (ref 98–107)
CLARITY UR: CLEAR
CO2 SERPL-SCNC: 23.8 MMOL/L (ref 22–29)
COLOR UR: YELLOW
CREAT SERPL-MCNC: 0.63 MG/DL (ref 0.57–1)
DEPRECATED RDW RBC AUTO: 37.8 FL (ref 37–54)
EGFRCR SERPLBLD CKD-EPI 2021: 120.3 ML/MIN/1.73
EOSINOPHIL # BLD AUTO: 0.09 10*3/MM3 (ref 0–0.4)
EOSINOPHIL NFR BLD AUTO: 0.9 % (ref 0.3–6.2)
ERYTHROCYTE [DISTWIDTH] IN BLOOD BY AUTOMATED COUNT: 13.2 % (ref 12.3–15.4)
GLOBULIN UR ELPH-MCNC: 3.4 GM/DL
GLUCOSE SERPL-MCNC: 96 MG/DL (ref 65–99)
GLUCOSE UR STRIP-MCNC: NEGATIVE MG/DL
HCT VFR BLD AUTO: 38.8 % (ref 34–46.6)
HGB BLD-MCNC: 12.8 G/DL (ref 12–15.9)
HGB UR QL STRIP.AUTO: NEGATIVE
IMM GRANULOCYTES # BLD AUTO: 0.02 10*3/MM3 (ref 0–0.05)
IMM GRANULOCYTES NFR BLD AUTO: 0.2 % (ref 0–0.5)
KETONES UR QL STRIP: NEGATIVE
LEUKOCYTE ESTERASE UR QL STRIP.AUTO: NEGATIVE
LYMPHOCYTES # BLD AUTO: 2.81 10*3/MM3 (ref 0.7–3.1)
LYMPHOCYTES NFR BLD AUTO: 28.3 % (ref 19.6–45.3)
MCH RBC QN AUTO: 26.7 PG (ref 26.6–33)
MCHC RBC AUTO-ENTMCNC: 33 G/DL (ref 31.5–35.7)
MCV RBC AUTO: 81 FL (ref 79–97)
MONOCYTES # BLD AUTO: 0.32 10*3/MM3 (ref 0.1–0.9)
MONOCYTES NFR BLD AUTO: 3.2 % (ref 5–12)
NEUTROPHILS NFR BLD AUTO: 6.64 10*3/MM3 (ref 1.7–7)
NEUTROPHILS NFR BLD AUTO: 67 % (ref 42.7–76)
NITRITE UR QL STRIP: NEGATIVE
NRBC BLD AUTO-RTO: 0 /100 WBC (ref 0–0.2)
PH UR STRIP.AUTO: 6 [PH] (ref 4.5–8)
PLATELET # BLD AUTO: 336 10*3/MM3 (ref 140–450)
PMV BLD AUTO: 8.1 FL (ref 6–12)
POTASSIUM SERPL-SCNC: 3.7 MMOL/L (ref 3.5–5.2)
PROT SERPL-MCNC: 7.6 G/DL (ref 6–8.5)
PROT UR QL STRIP: NEGATIVE
RBC # BLD AUTO: 4.79 10*6/MM3 (ref 3.77–5.28)
SODIUM SERPL-SCNC: 138 MMOL/L (ref 136–145)
SP GR UR STRIP: 1.02 (ref 1–1.03)
UROBILINOGEN UR QL STRIP: NORMAL
WBC NRBC COR # BLD AUTO: 9.92 10*3/MM3 (ref 3.4–10.8)

## 2024-10-07 PROCEDURE — 25010000002 ONDANSETRON PER 1 MG: Performed by: INTERNAL MEDICINE

## 2024-10-07 PROCEDURE — 25010000002 KETOROLAC TROMETHAMINE PER 15 MG: Performed by: INTERNAL MEDICINE

## 2024-10-07 PROCEDURE — 99284 EMERGENCY DEPT VISIT MOD MDM: CPT | Performed by: INTERNAL MEDICINE

## 2024-10-07 PROCEDURE — 80053 COMPREHEN METABOLIC PANEL: CPT | Performed by: INTERNAL MEDICINE

## 2024-10-07 PROCEDURE — 25010000002 MORPHINE PER 10 MG: Performed by: INTERNAL MEDICINE

## 2024-10-07 PROCEDURE — 81003 URINALYSIS AUTO W/O SCOPE: CPT | Performed by: INTERNAL MEDICINE

## 2024-10-07 PROCEDURE — 74176 CT ABD & PELVIS W/O CONTRAST: CPT

## 2024-10-07 PROCEDURE — 85025 COMPLETE CBC W/AUTO DIFF WBC: CPT | Performed by: INTERNAL MEDICINE

## 2024-10-07 PROCEDURE — 96374 THER/PROPH/DIAG INJ IV PUSH: CPT

## 2024-10-07 PROCEDURE — 96375 TX/PRO/DX INJ NEW DRUG ADDON: CPT

## 2024-10-07 RX ORDER — KETOROLAC TROMETHAMINE 30 MG/ML
30 INJECTION, SOLUTION INTRAMUSCULAR; INTRAVENOUS ONCE
Status: COMPLETED | OUTPATIENT
Start: 2024-10-07 | End: 2024-10-07

## 2024-10-07 RX ORDER — ONDANSETRON 2 MG/ML
4 INJECTION INTRAMUSCULAR; INTRAVENOUS ONCE
Status: COMPLETED | OUTPATIENT
Start: 2024-10-07 | End: 2024-10-07

## 2024-10-07 RX ADMIN — KETOROLAC TROMETHAMINE 30 MG: 30 INJECTION, SOLUTION INTRAMUSCULAR; INTRAVENOUS at 18:22

## 2024-10-07 RX ADMIN — ONDANSETRON 4 MG: 2 INJECTION INTRAMUSCULAR; INTRAVENOUS at 18:49

## 2024-10-07 RX ADMIN — MORPHINE SULFATE 4 MG: 4 INJECTION, SOLUTION INTRAMUSCULAR; INTRAVENOUS at 20:37

## 2024-10-07 NOTE — Clinical Note
[place]  [facility] EMERGENCY DEPARTMENT  [address]   [place] [address]  Phone: [phone]    Carolina Frias was seen and treated in our emergency department on 10/7/2024.  She may return to work on 10/08/2024.         Thank you for choosing [facility].    Alena Clarke MD

## 2024-10-07 NOTE — ED PROVIDER NOTES
"Subjective right flank pain    History of Present Illness  Patient presents to the ED for evaluation treatment of right flank pain.  She is reporting onset of symptoms for the past 24 hours.  She has constant dull pain with episodes of sharp stabbing discomfort.  The pain is in the right lower quadrant and radiates to her back.  She has associated nausea, vomiting  but no fevers, chills, dysuria or hematuria.  She reports of similar episodes in the past associated with kidney stones.    Review of Systems   Constitutional:  Positive for activity change, chills and fever. Negative for appetite change.   Respiratory:  Negative for cough, chest tightness and shortness of breath.    Cardiovascular:  Negative for chest pain.   Gastrointestinal:  Positive for abdominal pain, nausea and vomiting. Negative for diarrhea.   Genitourinary:  Positive for flank pain. Negative for difficulty urinating, dysuria and hematuria.   Musculoskeletal:  Positive for back pain.       Past Medical History:   Diagnosis Date    Allergic     ASCUS of cervix with negative high risk HPV 2014    Atypical squamous cells of undetermined significance (ASCUS) on Papanicolaou smear of cervix 2012    NO HPV DONE    Bipolar depression     Cervical high risk HPV (human papillomavirus) test positive 2015    HPV 16    HTN in pregnancy, chronic 2021    Kidney stones     LGSIL on Pap smear of cervix 2011    Migraine     SAB (spontaneous ) 2006    SAB (spontaneous ) 2007       Allergies   Allergen Reactions    Lamotrigine Hives and Other (See Comments)     Also states that she \"cannot walk.\"    Lactose Nausea Only    Amoxicillin Hives    Gabapentin Other (See Comments)     Pt prefers not to take this drug    Sertraline Hives       Past Surgical History:   Procedure Laterality Date     SECTION      x 2     COLPOSCOPY  2011    NO PATH SENT    COLPOSCOPY W/ BIOPSY / CURETTAGE  2014    LGSIL    " COLPOSCOPY W/ BIOPSY / CURETTAGE  02/08/2012    CHRONIC CERVICITIS AND SQUAMOUS METAPLASIA    COLPOSCOPY W/ BIOPSY / CURETTAGE  02/17/2015    NEGATIVE    HYSTERECTOMY      TONSILLECTOMY      TUBAL ABDOMINAL LIGATION         Family History   Problem Relation Age of Onset    Atrial fibrillation Father     Diabetes type I Son     Other Paternal Aunt         Removed pre-cancer lesion in breast and ovary- CHk2+- age 43    Ovarian cancer Maternal Grandmother     Breast cancer Paternal Grandmother         estrogen receptor positive    Ovarian cancer Paternal Great-Grandmother        Social History     Socioeconomic History    Marital status: Single   Tobacco Use    Smoking status: Every Day     Current packs/day: 0.50     Average packs/day: 0.5 packs/day for 20.8 years (10.4 ttl pk-yrs)     Types: Cigarettes, Electronic Cigarette     Start date: 2004    Smokeless tobacco: Never    Tobacco comments:     cigarettes and e-cigarettes    Vaping Use    Vaping status: Some Days    Substances: Nicotine    Devices: Disposable   Substance and Sexual Activity    Alcohol use: No     Comment: rare    Drug use: Yes     Types: Cocaine(coke), Hydrocodone, Marijuana, Oxycodone     Comment: cbd gummies    Sexual activity: Yes     Partners: Male     Birth control/protection: None, Tubal ligation, Hysterectomy, Partner of same sex           Objective   Physical Exam  Vitals (Blood pressure 208/140) and nursing note reviewed. Exam conducted with a chaperone present.   Constitutional:       General: She is not in acute distress.     Appearance: Normal appearance. She is obese. She is not ill-appearing or toxic-appearing.   HENT:      Mouth/Throat:      Mouth: Mucous membranes are moist.   Eyes:      Extraocular Movements: Extraocular movements intact.   Cardiovascular:      Rate and Rhythm: Normal rate and regular rhythm.      Heart sounds: Normal heart sounds.   Pulmonary:      Effort: Pulmonary effort is normal.      Breath sounds: Normal  breath sounds.   Abdominal:      General: Bowel sounds are normal.      Palpations: Abdomen is soft.   Musculoskeletal:         General: Normal range of motion.      Cervical back: Neck supple.   Skin:     General: Skin is warm and dry.   Neurological:      General: No focal deficit present.      Mental Status: She is alert and oriented to person, place, and time.   Psychiatric:         Mood and Affect: Mood normal.         Behavior: Behavior normal.         Procedures           ED Course  ED Course as of 10/07/24 2026   Mon Oct 07, 2024   1931 CT of the abdomen pelvis IMPRESSION:  Impression:  Right ovarian cyst measuring 5 cm without significant pelvic free fluid. If clinical suspicion for ovarian torsion, pelvic ultrasound can be obtained.     Otherwise, no acute findings in the abdomen/pelvis.     Hepatic steatosis.   [UU]   1931 Creatinine: 0.63 [UU]   1931 WBC: 9.92 [UU]   1931 Hemoglobin: 12.8 [UU]   1931 Leukocytes, UA: Negative [UU]      ED Course User Index  [UU] Alena Clarke MD                                             Medical Decision Making  Patient is a 34-year-old female presenting with right lower quadrant pain.  Differential diagnosis includes appendicitis, kidney stones, pyelonephritis, ovarian cyst or torsion.  I have ordered and reviewed the relevant labs and images.  Labs unremarkable including UA which shows no infection.  CT of the abdomen pelvis was obtained and shows no kidney stones or appendicitis.  Patient only has isolated ovaries with no tubes or uterus, hence there is no concerns for torsion here.  I have attempted to optimize her pain control.  Patient is to follow-up with her gynecologist for further evaluation and guidance.    Amount and/or Complexity of Data Reviewed  Labs: ordered. Decision-making details documented in ED Course.  Radiology: ordered. Decision-making details documented in ED Course.    Risk  Prescription drug management.        Final diagnoses:   Ovarian cyst,  right       ED Disposition  ED Disposition       ED Disposition   Discharge    Condition   Stable    Comment   --               Hansa Daniels MD  [address] [address]  [phone]    In 2 days           Medication List      No changes were made to your prescriptions during this visit.            Alena Clarke MD  10/07/24 2026

## 2024-10-07 NOTE — Clinical Note
[place]  [facility] EMERGENCY DEPARTMENT  [address]   [place] [address]  Phone: [phone]    Carolina Frias was seen and treated in our emergency department on 10/7/2024.  She may return to work on 10/08/2024.         Thank you for choosing [facility].    Alena Clarke MD

## 2024-10-07 NOTE — Clinical Note
[place]  [facility] EMERGENCY DEPARTMENT  [address]   [place] [address]  Phone: [phone]    Carolina Frias was seen and treated in our emergency department on 10/7/2024.  She may return to work on 10/09/2024.         Thank you for choosing [facility].    Alena Clarke MD

## 2025-02-12 ENCOUNTER — OFFICE VISIT (OUTPATIENT)
Dept: FAMILY MEDICINE CLINIC | Facility: CLINIC | Age: 35
End: 2025-02-12
Payer: COMMERCIAL

## 2025-02-12 VITALS
HEIGHT: 62 IN | DIASTOLIC BLOOD PRESSURE: 92 MMHG | WEIGHT: 293 LBS | SYSTOLIC BLOOD PRESSURE: 140 MMHG | BODY MASS INDEX: 53.92 KG/M2 | OXYGEN SATURATION: 96 % | HEART RATE: 86 BPM

## 2025-02-12 DIAGNOSIS — R73.9 HYPERGLYCEMIA: ICD-10-CM

## 2025-02-12 DIAGNOSIS — M79.602 PARESTHESIA AND PAIN OF BOTH UPPER EXTREMITIES: ICD-10-CM

## 2025-02-12 DIAGNOSIS — R29.898 HAND WEAKNESS: Primary | ICD-10-CM

## 2025-02-12 DIAGNOSIS — I10 ESSENTIAL HYPERTENSION: ICD-10-CM

## 2025-02-12 DIAGNOSIS — R20.2 PARESTHESIA AND PAIN OF BOTH UPPER EXTREMITIES: ICD-10-CM

## 2025-02-12 DIAGNOSIS — E55.9 VITAMIN D DEFICIENCY: ICD-10-CM

## 2025-02-12 DIAGNOSIS — R25.1 TREMOR: ICD-10-CM

## 2025-02-12 DIAGNOSIS — M79.601 PARESTHESIA AND PAIN OF BOTH UPPER EXTREMITIES: ICD-10-CM

## 2025-02-12 DIAGNOSIS — Z23 ENCOUNTER FOR VACCINATION: ICD-10-CM

## 2025-02-12 DIAGNOSIS — E28.2 PCOS (POLYCYSTIC OVARIAN SYNDROME): ICD-10-CM

## 2025-02-12 PROCEDURE — 99214 OFFICE O/P EST MOD 30 MIN: CPT | Performed by: FAMILY MEDICINE

## 2025-02-12 PROCEDURE — 90471 IMMUNIZATION ADMIN: CPT | Performed by: FAMILY MEDICINE

## 2025-02-12 PROCEDURE — 90656 IIV3 VACC NO PRSV 0.5 ML IM: CPT | Performed by: FAMILY MEDICINE

## 2025-02-12 NOTE — PATIENT INSTRUCTIONS
Your blood pressure is up today and I believe you that has been good at home but I do have several high readings in the office.  Monitor your blood pressure at home bring a list with you next time and if you find that it is running high we can always start a medication by a MyChart message or phone call.  Do not drink an energy drink before you come next time.     We may consider a nerve study and or neck x-rays depending on the lab results.     I have ordered lab tests today.  You should receive a phone call or a [employer] message with those results.  If you have not heard from us in 7-10 days, please call the office.

## 2025-02-12 NOTE — PROGRESS NOTES
"Subjective     Carolina Frias is a 34 y.o. female who presents with   Chief Complaint   Patient presents with    Hypertension    Numbness     In hands, stiff upper body spells, speech problem        History of Present Illness     Having on and off issues with her hands, neck and shoulders for the past 5 years.  She'll get tension in her hands and shoulders and even shake.  She can  to put her earphones away.  It's intermittent but more frequent.  Occurring for a few seconds at a time. She will have trouble with stuttering her words at times too over the past year. Not always occurring with stress.  Life has been more peaceful.   She originally thought it was stress but that's been less and the episodes have increased.     Hypertension and had been on nifedipine XL and hctz 12.5.  She's not taking either and trying a more natural route and her pressures have been good. At home.  She did have a high caffeine energy drink today.   She's had some weight gain.     She's had a hysterectomy so no periods. She's on a vitamin D coated supplement.              Review of Systems     Objective     /92   Pulse 86   Ht 157.5 cm (62\")   Wt (!) 141 kg (310 lb 4.8 oz)   LMP 05/09/2023 (Exact Date)   SpO2 96%   Breastfeeding No   BMI 56.75 kg/m²     Physical Exam  Constitutional:       Appearance: Normal appearance.   Cardiovascular:      Rate and Rhythm: Normal rate and regular rhythm.      Heart sounds: Normal heart sounds.   Pulmonary:      Effort: Pulmonary effort is normal.      Breath sounds: Normal breath sounds.   Musculoskeletal:         General: No tenderness or deformity.   Neurological:      Mental Status: She is alert.      Comments: Left  slightly weaker than the right   Psychiatric:         Behavior: Behavior normal.         Procedures     Assessment & Plan   Diagnoses and all orders for this visit:    1. Hand weakness (Primary)  -     Vitamin B12  -     TSH    2. Hyperglycemia    3. Vitamin " D deficiency  -     Vitamin D,25-Hydroxy    4. Tremor    5. Essential hypertension  -     Comprehensive Metabolic Panel  -     CBC & Differential    6. Paresthesia and pain of both upper extremities  -     Copper, Serum    7. PCOS (polycystic ovarian syndrome)  -     Hemoglobin A1c    8. Encounter for vaccination  -     Fluzone >6mos             Discussion    Patient Instructions   Your blood pressure is up today and I believe you that has been good at home but I do have several high readings in the office.  Monitor your blood pressure at home bring a list with you next time and if you find that it is running high we can always start a medication by a MyChart message or phone call.  Do not drink an energy drink before you come next time.     We may consider a nerve study and or neck x-rays depending on the lab results.     I have ordered lab tests today.  You should receive a phone call or a [employer] message with those results.  If you have not heard from us in 7-10 days, please call the office.                Hansa Daniels MD

## 2025-02-18 LAB
25(OH)D3+25(OH)D2 SERPL-MCNC: 29.6 NG/ML (ref 30–100)
ALBUMIN SERPL-MCNC: 4.4 G/DL (ref 3.9–4.9)
ALP SERPL-CCNC: 87 IU/L (ref 44–121)
ALT SERPL-CCNC: 19 IU/L (ref 0–32)
AST SERPL-CCNC: 15 IU/L (ref 0–40)
BASOPHILS # BLD AUTO: 0 X10E3/UL (ref 0–0.2)
BASOPHILS NFR BLD AUTO: 0 %
BILIRUB SERPL-MCNC: 0.3 MG/DL (ref 0–1.2)
BUN SERPL-MCNC: 12 MG/DL (ref 6–20)
BUN/CREAT SERPL: 19 (ref 9–23)
CALCIUM SERPL-MCNC: 9.1 MG/DL (ref 8.7–10.2)
CHLORIDE SERPL-SCNC: 100 MMOL/L (ref 96–106)
CO2 SERPL-SCNC: 22 MMOL/L (ref 20–29)
COPPER SERPL-MCNC: 144 UG/DL (ref 80–158)
CREAT SERPL-MCNC: 0.63 MG/DL (ref 0.57–1)
EGFRCR SERPLBLD CKD-EPI 2021: 120 ML/MIN/1.73
EOSINOPHIL # BLD AUTO: 0.1 X10E3/UL (ref 0–0.4)
EOSINOPHIL NFR BLD AUTO: 1 %
ERYTHROCYTE [DISTWIDTH] IN BLOOD BY AUTOMATED COUNT: 13 % (ref 11.7–15.4)
GLOBULIN SER CALC-MCNC: 2.8 G/DL (ref 1.5–4.5)
GLUCOSE SERPL-MCNC: 108 MG/DL (ref 70–99)
HBA1C MFR BLD: 5.9 % (ref 4.8–5.6)
HCT VFR BLD AUTO: 38.7 % (ref 34–46.6)
HGB BLD-MCNC: 12.9 G/DL (ref 11.1–15.9)
IMM GRANULOCYTES # BLD AUTO: 0 X10E3/UL (ref 0–0.1)
IMM GRANULOCYTES NFR BLD AUTO: 0 %
LYMPHOCYTES # BLD AUTO: 2.1 X10E3/UL (ref 0.7–3.1)
LYMPHOCYTES NFR BLD AUTO: 28 %
MCH RBC QN AUTO: 27 PG (ref 26.6–33)
MCHC RBC AUTO-ENTMCNC: 33.3 G/DL (ref 31.5–35.7)
MCV RBC AUTO: 81 FL (ref 79–97)
MONOCYTES # BLD AUTO: 0.2 X10E3/UL (ref 0.1–0.9)
MONOCYTES NFR BLD AUTO: 3 %
NEUTROPHILS # BLD AUTO: 5.2 X10E3/UL (ref 1.4–7)
NEUTROPHILS NFR BLD AUTO: 68 %
PLATELET # BLD AUTO: 398 X10E3/UL (ref 150–450)
POTASSIUM SERPL-SCNC: 3.7 MMOL/L (ref 3.5–5.2)
PROT SERPL-MCNC: 7.2 G/DL (ref 6–8.5)
RBC # BLD AUTO: 4.78 X10E6/UL (ref 3.77–5.28)
SODIUM SERPL-SCNC: 139 MMOL/L (ref 134–144)
TSH SERPL DL<=0.005 MIU/L-ACNC: 3 UIU/ML (ref 0.45–4.5)
VIT B12 SERPL-MCNC: 542 PG/ML (ref 232–1245)
WBC # BLD AUTO: 7.6 X10E3/UL (ref 3.4–10.8)

## 2025-02-19 DIAGNOSIS — M79.601 PARESTHESIA AND PAIN OF BOTH UPPER EXTREMITIES: Primary | ICD-10-CM

## 2025-02-19 DIAGNOSIS — R29.898 HAND WEAKNESS: ICD-10-CM

## 2025-02-19 DIAGNOSIS — R20.2 PARESTHESIA AND PAIN OF BOTH UPPER EXTREMITIES: Primary | ICD-10-CM

## 2025-02-19 DIAGNOSIS — F80.81 STUTTER: ICD-10-CM

## 2025-02-19 DIAGNOSIS — M79.602 PARESTHESIA AND PAIN OF BOTH UPPER EXTREMITIES: Primary | ICD-10-CM

## 2025-03-05 ENCOUNTER — PATIENT MESSAGE (OUTPATIENT)
Dept: FAMILY MEDICINE CLINIC | Facility: CLINIC | Age: 35
End: 2025-03-05
Payer: COMMERCIAL

## 2025-03-05 RX ORDER — DIAZEPAM 5 MG/1
TABLET ORAL
Qty: 2 TABLET | Refills: 0 | Status: SHIPPED | OUTPATIENT
Start: 2025-03-05

## 2025-03-14 ENCOUNTER — HOSPITAL ENCOUNTER (OUTPATIENT)
Dept: MRI IMAGING | Facility: HOSPITAL | Age: 35
Discharge: HOME | End: 2025-03-14
Admitting: FAMILY MEDICINE
Payer: COMMERCIAL

## 2025-03-14 ENCOUNTER — RESULTS FOLLOW-UP (OUTPATIENT)
Dept: MRI IMAGING | Facility: HOSPITAL | Age: 35
End: 2025-03-14
Payer: COMMERCIAL

## 2025-03-14 DIAGNOSIS — M79.601 PARESTHESIA AND PAIN OF BOTH UPPER EXTREMITIES: Primary | ICD-10-CM

## 2025-03-14 DIAGNOSIS — R29.898 HAND WEAKNESS: ICD-10-CM

## 2025-03-14 DIAGNOSIS — M79.601 PARESTHESIA AND PAIN OF BOTH UPPER EXTREMITIES: ICD-10-CM

## 2025-03-14 DIAGNOSIS — F80.81 STUTTER: ICD-10-CM

## 2025-03-14 DIAGNOSIS — R25.1 TREMOR: ICD-10-CM

## 2025-03-14 DIAGNOSIS — R20.2 PARESTHESIA AND PAIN OF BOTH UPPER EXTREMITIES: Primary | ICD-10-CM

## 2025-03-14 DIAGNOSIS — M79.602 PARESTHESIA AND PAIN OF BOTH UPPER EXTREMITIES: ICD-10-CM

## 2025-03-14 DIAGNOSIS — R20.2 PARESTHESIA AND PAIN OF BOTH UPPER EXTREMITIES: ICD-10-CM

## 2025-03-14 DIAGNOSIS — M79.602 PARESTHESIA AND PAIN OF BOTH UPPER EXTREMITIES: Primary | ICD-10-CM

## 2025-03-14 PROCEDURE — 70551 MRI BRAIN STEM W/O DYE: CPT

## 2025-04-28 ENCOUNTER — APPOINTMENT (OUTPATIENT)
Dept: CT IMAGING | Facility: HOSPITAL | Age: 35
End: 2025-04-28
Payer: COMMERCIAL

## 2025-04-28 ENCOUNTER — APPOINTMENT (OUTPATIENT)
Dept: ULTRASOUND IMAGING | Facility: HOSPITAL | Age: 35
End: 2025-04-28
Payer: COMMERCIAL

## 2025-04-28 ENCOUNTER — HOSPITAL ENCOUNTER (EMERGENCY)
Facility: HOSPITAL | Age: 35
Discharge: HOME | End: 2025-04-28
Attending: STUDENT IN AN ORGANIZED HEALTH CARE EDUCATION/TRAINING PROGRAM | Admitting: STUDENT IN AN ORGANIZED HEALTH CARE EDUCATION/TRAINING PROGRAM
Payer: COMMERCIAL

## 2025-04-28 VITALS
TEMPERATURE: 98.2 F | RESPIRATION RATE: 18 BRPM | HEART RATE: 94 BPM | DIASTOLIC BLOOD PRESSURE: 110 MMHG | WEIGHT: 280 LBS | BODY MASS INDEX: 51.53 KG/M2 | HEIGHT: 62 IN | OXYGEN SATURATION: 97 % | SYSTOLIC BLOOD PRESSURE: 199 MMHG

## 2025-04-28 DIAGNOSIS — R10.9 ABDOMINAL PAIN, UNSPECIFIED ABDOMINAL LOCATION: Primary | ICD-10-CM

## 2025-04-28 DIAGNOSIS — N30.00 ACUTE CYSTITIS WITHOUT HEMATURIA: ICD-10-CM

## 2025-04-28 LAB
ALBUMIN SERPL-MCNC: 4.2 G/DL (ref 3.5–5.2)
ALBUMIN/GLOB SERPL: 1.5 G/DL
ALP SERPL-CCNC: 77 U/L (ref 39–117)
ALT SERPL W P-5'-P-CCNC: 24 U/L (ref 1–33)
ANION GAP SERPL CALCULATED.3IONS-SCNC: 13.2 MMOL/L (ref 5–15)
AST SERPL-CCNC: 23 U/L (ref 1–32)
B-HCG UR QL: NEGATIVE
BACTERIA UR QL AUTO: ABNORMAL /HPF
BASOPHILS # BLD AUTO: 0.02 10*3/MM3 (ref 0–0.2)
BASOPHILS NFR BLD AUTO: 0.2 % (ref 0–1.5)
BILIRUB SERPL-MCNC: 0.3 MG/DL (ref 0–1.2)
BILIRUB UR QL STRIP: NEGATIVE
BUN SERPL-MCNC: 11 MG/DL (ref 6–20)
BUN/CREAT SERPL: 19 (ref 7–25)
C TRACH RRNA CVX QL NAA+PROBE: NOT DETECTED
CALCIUM SPEC-SCNC: 9 MG/DL (ref 8.6–10.5)
CHLORIDE SERPL-SCNC: 101 MMOL/L (ref 98–107)
CLARITY UR: CLEAR
CO2 SERPL-SCNC: 22.8 MMOL/L (ref 22–29)
COD CRY URNS QL: ABNORMAL /HPF
COLOR UR: YELLOW
CREAT SERPL-MCNC: 0.58 MG/DL (ref 0.57–1)
DEPRECATED RDW RBC AUTO: 36.9 FL (ref 37–54)
EGFRCR SERPLBLD CKD-EPI 2021: 122.7 ML/MIN/1.73
EOSINOPHIL # BLD AUTO: 0.1 10*3/MM3 (ref 0–0.4)
EOSINOPHIL NFR BLD AUTO: 1.2 % (ref 0.3–6.2)
ERYTHROCYTE [DISTWIDTH] IN BLOOD BY AUTOMATED COUNT: 12.7 % (ref 12.3–15.4)
GLOBULIN UR ELPH-MCNC: 2.8 GM/DL
GLUCOSE SERPL-MCNC: 105 MG/DL (ref 65–99)
GLUCOSE UR STRIP-MCNC: NEGATIVE MG/DL
HCT VFR BLD AUTO: 38.4 % (ref 34–46.6)
HGB BLD-MCNC: 13.1 G/DL (ref 12–15.9)
HGB UR QL STRIP.AUTO: NEGATIVE
HYALINE CASTS UR QL AUTO: ABNORMAL /LPF
IMM GRANULOCYTES # BLD AUTO: 0.02 10*3/MM3 (ref 0–0.05)
IMM GRANULOCYTES NFR BLD AUTO: 0.2 % (ref 0–0.5)
KETONES UR QL STRIP: NEGATIVE
LEUKOCYTE ESTERASE UR QL STRIP.AUTO: NEGATIVE
LIPASE SERPL-CCNC: 66 U/L (ref 13–60)
LYMPHOCYTES # BLD AUTO: 2.4 10*3/MM3 (ref 0.7–3.1)
LYMPHOCYTES NFR BLD AUTO: 28.6 % (ref 19.6–45.3)
MCH RBC QN AUTO: 27.8 PG (ref 26.6–33)
MCHC RBC AUTO-ENTMCNC: 34.1 G/DL (ref 31.5–35.7)
MCV RBC AUTO: 81.4 FL (ref 79–97)
MONOCYTES # BLD AUTO: 0.4 10*3/MM3 (ref 0.1–0.9)
MONOCYTES NFR BLD AUTO: 4.8 % (ref 5–12)
MUCOUS THREADS URNS QL MICRO: ABNORMAL /HPF
N GONORRHOEA RRNA SPEC QL NAA+PROBE: NOT DETECTED
NEUTROPHILS NFR BLD AUTO: 5.44 10*3/MM3 (ref 1.7–7)
NEUTROPHILS NFR BLD AUTO: 65 % (ref 42.7–76)
NITRITE UR QL STRIP: NEGATIVE
NRBC BLD AUTO-RTO: 0 /100 WBC (ref 0–0.2)
PH UR STRIP.AUTO: 6 [PH] (ref 4.5–8)
PLATELET # BLD AUTO: 313 10*3/MM3 (ref 140–450)
PMV BLD AUTO: 8.2 FL (ref 6–12)
POTASSIUM SERPL-SCNC: 3.6 MMOL/L (ref 3.5–5.2)
PROT SERPL-MCNC: 7 G/DL (ref 6–8.5)
PROT UR QL STRIP: ABNORMAL
RBC # BLD AUTO: 4.72 10*6/MM3 (ref 3.77–5.28)
RBC # UR STRIP: ABNORMAL /HPF
REF LAB TEST METHOD: ABNORMAL
SODIUM SERPL-SCNC: 137 MMOL/L (ref 136–145)
SP GR UR STRIP: 1.03 (ref 1–1.03)
SQUAMOUS #/AREA URNS HPF: ABNORMAL /HPF
UROBILINOGEN UR QL STRIP: ABNORMAL
WBC # UR STRIP: ABNORMAL /HPF
WBC NRBC COR # BLD AUTO: 8.38 10*3/MM3 (ref 3.4–10.8)

## 2025-04-28 PROCEDURE — 25010000002 KETOROLAC TROMETHAMINE PER 15 MG: Performed by: STUDENT IN AN ORGANIZED HEALTH CARE EDUCATION/TRAINING PROGRAM

## 2025-04-28 PROCEDURE — 87491 CHLMYD TRACH DNA AMP PROBE: CPT | Performed by: STUDENT IN AN ORGANIZED HEALTH CARE EDUCATION/TRAINING PROGRAM

## 2025-04-28 PROCEDURE — 76705 ECHO EXAM OF ABDOMEN: CPT

## 2025-04-28 PROCEDURE — 81025 URINE PREGNANCY TEST: CPT | Performed by: STUDENT IN AN ORGANIZED HEALTH CARE EDUCATION/TRAINING PROGRAM

## 2025-04-28 PROCEDURE — 25810000003 SODIUM CHLORIDE 0.9 % SOLUTION: Performed by: STUDENT IN AN ORGANIZED HEALTH CARE EDUCATION/TRAINING PROGRAM

## 2025-04-28 PROCEDURE — 96361 HYDRATE IV INFUSION ADD-ON: CPT

## 2025-04-28 PROCEDURE — 87591 N.GONORRHOEAE DNA AMP PROB: CPT | Performed by: STUDENT IN AN ORGANIZED HEALTH CARE EDUCATION/TRAINING PROGRAM

## 2025-04-28 PROCEDURE — 25010000002 METOCLOPRAMIDE PER 10 MG: Performed by: STUDENT IN AN ORGANIZED HEALTH CARE EDUCATION/TRAINING PROGRAM

## 2025-04-28 PROCEDURE — 25510000001 IOPAMIDOL PER 1 ML: Performed by: STUDENT IN AN ORGANIZED HEALTH CARE EDUCATION/TRAINING PROGRAM

## 2025-04-28 PROCEDURE — 99285 EMERGENCY DEPT VISIT HI MDM: CPT | Performed by: STUDENT IN AN ORGANIZED HEALTH CARE EDUCATION/TRAINING PROGRAM

## 2025-04-28 PROCEDURE — 96375 TX/PRO/DX INJ NEW DRUG ADDON: CPT

## 2025-04-28 PROCEDURE — 80053 COMPREHEN METABOLIC PANEL: CPT | Performed by: STUDENT IN AN ORGANIZED HEALTH CARE EDUCATION/TRAINING PROGRAM

## 2025-04-28 PROCEDURE — 25010000002 MORPHINE PER 10 MG: Performed by: STUDENT IN AN ORGANIZED HEALTH CARE EDUCATION/TRAINING PROGRAM

## 2025-04-28 PROCEDURE — 83690 ASSAY OF LIPASE: CPT | Performed by: STUDENT IN AN ORGANIZED HEALTH CARE EDUCATION/TRAINING PROGRAM

## 2025-04-28 PROCEDURE — 96374 THER/PROPH/DIAG INJ IV PUSH: CPT

## 2025-04-28 PROCEDURE — 25010000002 ONDANSETRON PER 1 MG: Performed by: STUDENT IN AN ORGANIZED HEALTH CARE EDUCATION/TRAINING PROGRAM

## 2025-04-28 PROCEDURE — 74177 CT ABD & PELVIS W/CONTRAST: CPT

## 2025-04-28 PROCEDURE — 85025 COMPLETE CBC W/AUTO DIFF WBC: CPT | Performed by: STUDENT IN AN ORGANIZED HEALTH CARE EDUCATION/TRAINING PROGRAM

## 2025-04-28 PROCEDURE — 81001 URINALYSIS AUTO W/SCOPE: CPT | Performed by: STUDENT IN AN ORGANIZED HEALTH CARE EDUCATION/TRAINING PROGRAM

## 2025-04-28 RX ORDER — ACETAMINOPHEN 500 MG
1000 TABLET ORAL EVERY 6 HOURS PRN
Qty: 30 TABLET | Refills: 0 | Status: SHIPPED | OUTPATIENT
Start: 2025-04-28 | End: 2025-05-05

## 2025-04-28 RX ORDER — KETOROLAC TROMETHAMINE 30 MG/ML
15 INJECTION, SOLUTION INTRAMUSCULAR; INTRAVENOUS ONCE
Status: COMPLETED | OUTPATIENT
Start: 2025-04-28 | End: 2025-04-28

## 2025-04-28 RX ORDER — METOCLOPRAMIDE HYDROCHLORIDE 5 MG/ML
5 INJECTION INTRAMUSCULAR; INTRAVENOUS ONCE
Status: COMPLETED | OUTPATIENT
Start: 2025-04-28 | End: 2025-04-28

## 2025-04-28 RX ORDER — ONDANSETRON 2 MG/ML
4 INJECTION INTRAMUSCULAR; INTRAVENOUS ONCE
Status: COMPLETED | OUTPATIENT
Start: 2025-04-28 | End: 2025-04-28

## 2025-04-28 RX ORDER — ACETAMINOPHEN 500 MG
1000 TABLET ORAL ONCE
Status: COMPLETED | OUTPATIENT
Start: 2025-04-28 | End: 2025-04-28

## 2025-04-28 RX ORDER — HYDROCODONE BITARTRATE AND ACETAMINOPHEN 5; 325 MG/1; MG/1
1 TABLET ORAL ONCE
Refills: 0 | Status: COMPLETED | OUTPATIENT
Start: 2025-04-28 | End: 2025-04-28

## 2025-04-28 RX ORDER — IOPAMIDOL 755 MG/ML
100 INJECTION, SOLUTION INTRAVASCULAR
Status: COMPLETED | OUTPATIENT
Start: 2025-04-28 | End: 2025-04-28

## 2025-04-28 RX ORDER — PHENAZOPYRIDINE HYDROCHLORIDE 100 MG/1
200 TABLET, FILM COATED ORAL ONCE
Status: COMPLETED | OUTPATIENT
Start: 2025-04-28 | End: 2025-04-28

## 2025-04-28 RX ORDER — PHENAZOPYRIDINE HYDROCHLORIDE 95 MG/1
95 TABLET ORAL 3 TIMES DAILY PRN
Qty: 6 TABLET | Refills: 0 | Status: SHIPPED | OUTPATIENT
Start: 2025-04-28

## 2025-04-28 RX ORDER — SULFAMETHOXAZOLE AND TRIMETHOPRIM 800; 160 MG/1; MG/1
1 TABLET ORAL 2 TIMES DAILY
Qty: 20 TABLET | Refills: 0 | Status: SHIPPED | OUTPATIENT
Start: 2025-04-28 | End: 2025-05-08

## 2025-04-28 RX ADMIN — ACETAMINOPHEN 1000 MG: 500 TABLET, FILM COATED ORAL at 18:37

## 2025-04-28 RX ADMIN — KETOROLAC TROMETHAMINE 15 MG: 30 INJECTION, SOLUTION INTRAMUSCULAR; INTRAVENOUS at 18:37

## 2025-04-28 RX ADMIN — ONDANSETRON 4 MG: 2 INJECTION INTRAMUSCULAR; INTRAVENOUS at 18:37

## 2025-04-28 RX ADMIN — METOCLOPRAMIDE 5 MG: 5 INJECTION, SOLUTION INTRAMUSCULAR; INTRAVENOUS at 20:09

## 2025-04-28 RX ADMIN — PHENAZOPYRIDINE 200 MG: 100 TABLET ORAL at 21:48

## 2025-04-28 RX ADMIN — MORPHINE SULFATE 4 MG: 4 INJECTION, SOLUTION INTRAMUSCULAR; INTRAVENOUS at 19:42

## 2025-04-28 RX ADMIN — SODIUM CHLORIDE 1000 ML: 0.9 INJECTION, SOLUTION INTRAVENOUS at 20:09

## 2025-04-28 RX ADMIN — IOPAMIDOL 100 ML: 755 INJECTION, SOLUTION INTRAVENOUS at 20:28

## 2025-04-28 RX ADMIN — HYDROCODONE BITARTRATE AND ACETAMINOPHEN 1 TABLET: 5; 325 TABLET ORAL at 21:49

## 2025-04-28 NOTE — ED PROVIDER NOTES
"Subjective   History of Present Illness  34 female history of PCOS, ureterolithiasis presents to the emergency room chief complaint of migratory right lower quadrant and right upper quadrant abdominal pain rating to the right flank as well, associated symptoms nausea nonbloody nonbilious emesis, context of rest.  Denies fever chills, dysuria urgency frequency diarrhea constipation blood from any orifice or syncope.  ROS otherwise negative vitals are hypertensive 199/110 on exam patient is awake alert, tenderness palpation of the right upper quadrant positive bedside Rosario sign, no flank or right lower quadrant tenderness  Review of Systems    Past Medical History:   Diagnosis Date    Allergic     ASCUS of cervix with negative high risk HPV 2014    Atypical squamous cells of undetermined significance (ASCUS) on Papanicolaou smear of cervix 2012    NO HPV DONE    Bipolar depression     Cervical high risk HPV (human papillomavirus) test positive 2015    HPV 16    HTN in pregnancy, chronic 2021    Kidney stones     LGSIL on Pap smear of cervix 2011    Migraine     SAB (spontaneous ) 2006    SAB (spontaneous ) 2007       Allergies   Allergen Reactions    Lamotrigine Hives and Other (See Comments)     Also states that she \"cannot walk.\"    Lactose Nausea Only    Amoxicillin Hives    Gabapentin Other (See Comments)     Pt prefers not to take this drug    Sertraline Hives       Past Surgical History:   Procedure Laterality Date     SECTION      x 2     COLPOSCOPY  2011    NO PATH SENT    COLPOSCOPY W/ BIOPSY / CURETTAGE  2014    LGSIL    COLPOSCOPY W/ BIOPSY / CURETTAGE  2012    CHRONIC CERVICITIS AND SQUAMOUS METAPLASIA    COLPOSCOPY W/ BIOPSY / CURETTAGE  2015    NEGATIVE    ENDOMETRIAL ABLATION      HYSTERECTOMY      TONSILLECTOMY      TUBAL ABDOMINAL LIGATION         Family History   Problem Relation Age of Onset    Atrial fibrillation Father " "    Heart disease Father         Afib    Hypertension Father     Diabetes type I Son     Ovarian cancer Maternal Grandmother     Breast cancer Paternal Grandmother         estrogen receptor positive    Cancer Paternal Grandmother     COPD Paternal Grandfather     Ovarian cancer Paternal Great-Grandmother        Social History     Socioeconomic History    Marital status: Single   Tobacco Use    Smoking status: Some Days     Types: Electronic Cigarette    Smokeless tobacco: Never    Tobacco comments:     cigarettes and e-cigarettes    Vaping Use    Vaping status: Some Days    Substances: Nicotine    Devices: Disposable   Substance and Sexual Activity    Alcohol use: No     Comment: rare    Drug use: Yes     Types: \"Crack\" cocaine, Cocaine(coke), Hydrocodone, LSD, Marijuana, MDMA (ecstacy), Oxycodone     Comment: cbd gummies    Sexual activity: Yes     Partners: Male     Birth control/protection: None, Tubal ligation, Hysterectomy, Partner of same sex           Objective   Physical Exam    Procedures           ED Course                                                       Medical Decision Making  Problems Addressed:  Abdominal pain, unspecified abdominal location: complicated acute illness or injury  Acute cystitis without hematuria: complicated acute illness or injury    Amount and/or Complexity of Data Reviewed  Labs: ordered.  Radiology: ordered.    Risk  OTC drugs.  Prescription drug management.    34 female history of PCOS, ureterolithiasis presents to the emergency room chief complaint of migratory right lower quadrant and right upper quadrant abdominal pain rating to the right flank as well, associated symptoms nausea nonbloody nonbilious emesis, context of rest.  Denies fever chills, dysuria urgency frequency diarrhea constipation blood from any orifice or syncope.  ROS otherwise negative vitals are hypertensive 199/110 on exam patient is awake alert, tenderness palpation of the right upper quadrant positive " bedside Rosario sign, no flank or right lower quadrant tenderness    Concern for GB pathology; but with migratory nature of pain there is concern for appendicitis as well as strong consideration to uretolithiasis. I will start eval w/ abd labs, ua, RUQ US; if negative will proceed with CT scan.    7:26 pm RUQ US negative for GB path. Lipase marginally elevated, no epigastric ttp to suggest pancreatitis. CT sent.    8:30 pm ct negative for acute pathology; microscopic UA showing bacteria 2+; dc with bactrim PO, pain ctrl, pcp and uro fu    Hst pt  Dsp home    Final diagnoses:   Abdominal pain, unspecified abdominal location   Acute cystitis without hematuria       ED Disposition  ED Disposition       ED Disposition   Discharge    Condition   Stable    Comment   --               Hansa Daniels MD  [address] [id number] [phone]    In 2 days      -  Jace Nance MD - Urologist  [facility]  [address] [place] [address]  [phone]  Schedule an appointment as soon as possible for a visit   As needed         Medication List        New Prescriptions      acetaminophen 500 MG tablet  Commonly known as: TYLENOL  Take 2 tablets by mouth Every 6 (Six) Hours As Needed for Mild Pain for up to 7 days.     phenazopyridine 95 MG tablet  Commonly known as: PYRIDIUM  Take 1 tablet by mouth 3 (Three) Times a Day As Needed for Bladder Spasms.     sulfamethoxazole-trimethoprim 800-160 MG per tablet  Commonly known as: BACTRIM DS,SEPTRA DS  Take 1 tablet by mouth 2 (Two) Times a Day for 10 days.               Where to Get Your Medications        These medications were sent to [facility] [id number] - [place], [place] - [address] - [phone]  - [phone]  [address] [address]      Phone: [phone]   acetaminophen 500 MG tablet  phenazopyridine 95 MG tablet  sulfamethoxazole-trimethoprim 800-160 MG per tablet            Chance  Andrea Sloan MD  04/28/25 2058

## 2025-05-02 ENCOUNTER — OFFICE VISIT (OUTPATIENT)
Dept: FAMILY MEDICINE CLINIC | Facility: CLINIC | Age: 35
End: 2025-05-02
Payer: COMMERCIAL

## 2025-05-02 VITALS
HEIGHT: 62 IN | SYSTOLIC BLOOD PRESSURE: 140 MMHG | BODY MASS INDEX: 53.92 KG/M2 | HEART RATE: 102 BPM | OXYGEN SATURATION: 94 % | WEIGHT: 293 LBS | DIASTOLIC BLOOD PRESSURE: 70 MMHG

## 2025-05-02 DIAGNOSIS — R10.9 ACUTE FLANK PAIN: Primary | ICD-10-CM

## 2025-05-02 PROCEDURE — 99214 OFFICE O/P EST MOD 30 MIN: CPT | Performed by: FAMILY MEDICINE

## 2025-05-02 RX ORDER — HYDROCODONE BITARTRATE 20 MG/1
20 TABLET, EXTENDED RELEASE ORAL DAILY PRN
Qty: 5 EACH | Refills: 0 | Status: SHIPPED | OUTPATIENT
Start: 2025-05-02

## 2025-05-02 RX ORDER — ONDANSETRON 8 MG/1
8 TABLET, FILM COATED ORAL EVERY 8 HOURS PRN
Qty: 30 TABLET | Refills: 3 | Status: SHIPPED | OUTPATIENT
Start: 2025-05-02

## 2025-05-02 NOTE — PROGRESS NOTES
"Answers submitted by the patient for this visit:  Problem not listed (Submitted on 5/2/2025)  Chief Complaint: Other medical problem  abdominal pain: Yes  Other symptom: Kidney infection  Onset: in the past 7 days  Chronicity: recurrent  Frequency: daily  Medications tried: Hospital  Additional information: Hospital follow up, pain is not getting better.    Chief Complaint  Chief Complaint   Patient presents with    Pain       Subjective    History of Present Illness  Carolina Frias is a 34 y.o. female presents to [facility] for followup     History of Present Illness  The patient presents for evaluation of a bladder infection.    A recent diagnosis of a bladder infection that has ascended to the kidneys is reported, causing severe pain that impedes mobility and disrupts sleep. Cranberry juice has been consumed as part of the treatment regimen. Consultation with a urologist has not yet occurred. Bactrim was prescribed for 10 days and Pyridium for bladder spasms, but these medications have not provided relief. Over-the-counter analgesics such as Tylenol and ibuprofen 800 have also been ineffective. Flomax has been used previously for kidney stones, which was beneficial unless the stone became lodged. Diarrhea was experienced yesterday, attributed to diet and antibiotic use. Pain is localized to the right side. Tramadol has been tried in the past without success, but Norco has been effective. A history of substance abuse is noted, with abstinence maintained for 13 years. Currently working from home, a note for the employer recommending a few days off work is required.    SOCIAL HISTORY  She has been clean from illicit drugs for 13 years.    Objective   Vitals:    05/02/25 1511   BP: 140/70   Pulse: 102   SpO2: 94%   Weight: (!) 143 kg (316 lb 3.2 oz)   Height: 157.5 cm (62.01\")        Physical Exam  Constitutional:       General: She is not in acute distress.     Appearance: She is " not toxic-appearing.      Comments: Crying due to pain   Pulmonary:      Effort: Pulmonary effort is normal. No respiratory distress.   Neurological:      Mental Status: She is alert.          The following data was reviewed by: Gisel Soto MD on 05/02/2025:  CMP          10/7/2024    18:20 2/12/2025    14:04 4/28/2025    18:16   CMP   Glucose 96  108  105    BUN 10  12  11    Creatinine 0.63  0.63  0.58    EGFR 120.3  120  122.7    Sodium 138  139  137    Potassium 3.7  3.7  3.6    Chloride 102  100  101    Calcium 9.1  9.1  9.0    Total Protein 7.6  7.2  7.0    Albumin 4.2  4.4  4.2    Globulin 3.4  2.8  2.8    Total Bilirubin 0.3  0.3  0.3    Alkaline Phosphatase 85  87  77    AST (SGOT) 19  15  23    ALT (SGPT) 19  19  24    Albumin/Globulin Ratio 1.2   1.5    BUN/Creatinine Ratio 15.9  19  19.0    Anion Gap 12.2   13.2      UA          5/28/2024    13:41 10/7/2024    17:53 4/28/2025    18:17   Urinalysis   Squamous Epithelial Cells, UA   7-12    Specific Gravity, UA 1.022  1.024  1.032    Ketones, UA Negative  Negative  Negative    Blood, UA Negative  Negative  Negative    Leukocytes, UA Negative  Negative  Negative    Nitrite, UA Negative  Negative  Negative    RBC, UA   None Seen    WBC, UA   0-2    Bacteria, UA   2+        Radiologic studies CT abd/pelvis and ER notes      Assessment and Plan  Carolina Frias is a 34 y.o. female presents to [facility] today for followup    Assessment & Plan  1. Right flank pain  - Reports severe R flank pain and inability to walk or sleep due to the infection.  - Currently on Bactrim and Pyridium were ineffective.  - Norco requiring PA per chart; prescription for hydrocodone 20 mg extended release #5, to be taken once daily, has been provided. Advised to alternate between Tylenol 1000 mg and ibuprofen every 4 hours, ensuring these are taken with food and water.  - A prescription for Zofran 8 mg has also been issued. Informed about the  potential for constipation as a side effect of the combination of Zofran and hydrocodone, and the use of a stool softener has been recommended.   - Er recommend pt followup with Dr. Nance at [facility]   - A work note has been provided, recommending a 2-day leave of absence.    Diagnoses and all orders for this visit:    1. Acute flank pain (Primary)  -     HYDROcodone Bitartrate ER 20 MG tablet extended-release 24 hour ; Take 1 tablet by mouth Daily As Needed (severe pain). Indications: Pain  Dispense: 5 each; Refill: 0    Other orders  -     ondansetron (ZOFRAN) 8 MG tablet; Take 1 tablet by mouth Every 8 (Eight) Hours As Needed for Nausea or Vomiting.  Dispense: 30 tablet; Refill: 3        Patient voiced understanding and agreement with plan of care and had no further questions or concerns at this time.     Problems addressed: 1 or more chronic illnesses with exacerbation, progression, or side effects of treatment (MODERATE)   Complexity: labs reviewed yes  Risk: prescription drug management, controlled substance    Gisel Soto MD  Family Medicine  [facility]      Follow Up  No follow-ups on file.    Patient Instructions   Today: Continue antibiotic, call your urologist- Jace Nance MD - Urologist  [facility]  [address] [place] [address], [place] [address]  [phone]    Meds: Hydrocodone 20mg once daily- may be sedating  Alternate 1000mg tylenol every 4 hours with 800mg ibuprofen every 4 hours- take with food and water to reduce stomach upset         Patient or patient representative verbalized consent for the use of Ambient Listening during the visit with  Gisel Soto MD for chart documentation. 5/2/2025  16:54 EDT

## 2025-05-02 NOTE — LETTER
May 2, 2025     Patient: Carolina Frias   YOB: 1990   Date of Visit: 5/2/2025       To Whom It May Concern:    It is my medical opinion that Carolina Frias may return to work in three days.            Sincerely,        Gisel Soto MD    CC: No Recipients

## 2025-05-02 NOTE — PATIENT INSTRUCTIONS
Today: Continue antibiotic, call your urologist- Jace Nance MD - Urologist  [facility]  [address], [place] [address]  [phone]    Meds: Hydrocodone 20mg once daily- may be sedating  Alternate 1000mg tylenol every 4 hours with 800mg ibuprofen every 4 hours- take with food and water to reduce stomach upset

## 2025-05-09 ENCOUNTER — TELEPHONE (OUTPATIENT)
Dept: FAMILY MEDICINE CLINIC | Facility: CLINIC | Age: 35
End: 2025-05-09
Payer: COMMERCIAL

## 2025-05-09 NOTE — TELEPHONE ENCOUNTER
Received denial or hydrocodone 20mg XL for severe pain- per chart norco required a PA and hydrocodone XL did not, and patient was seen at the end of the day and in severe pain. Did patient get med filled?

## 2025-05-11 DIAGNOSIS — G43.009 MIGRAINE WITHOUT AURA AND WITHOUT STATUS MIGRAINOSUS, NOT INTRACTABLE: ICD-10-CM

## 2025-05-11 RX ORDER — RIZATRIPTAN BENZOATE 10 MG/1
TABLET ORAL
Qty: 9 TABLET | Refills: 5 | Status: SHIPPED | OUTPATIENT
Start: 2025-05-11

## 2025-05-15 ENCOUNTER — OFFICE VISIT (OUTPATIENT)
Dept: SLEEP MEDICINE | Facility: HOSPITAL | Age: 35
End: 2025-05-15
Payer: COMMERCIAL

## 2025-05-15 VITALS
BODY MASS INDEX: 53.92 KG/M2 | SYSTOLIC BLOOD PRESSURE: 118 MMHG | HEART RATE: 88 BPM | WEIGHT: 293 LBS | HEIGHT: 62 IN | DIASTOLIC BLOOD PRESSURE: 81 MMHG | OXYGEN SATURATION: 99 %

## 2025-05-15 DIAGNOSIS — E66.813 CLASS 3 SEVERE OBESITY DUE TO EXCESS CALORIES WITH SERIOUS COMORBIDITY AND BODY MASS INDEX (BMI) OF 50.0 TO 59.9 IN ADULT: Primary | ICD-10-CM

## 2025-05-15 DIAGNOSIS — E66.01 CLASS 3 SEVERE OBESITY DUE TO EXCESS CALORIES WITH SERIOUS COMORBIDITY AND BODY MASS INDEX (BMI) OF 50.0 TO 59.9 IN ADULT: Primary | ICD-10-CM

## 2025-05-15 DIAGNOSIS — E28.2 PCOS (POLYCYSTIC OVARIAN SYNDROME): ICD-10-CM

## 2025-05-15 DIAGNOSIS — G47.33 OSA (OBSTRUCTIVE SLEEP APNEA): ICD-10-CM

## 2025-05-15 DIAGNOSIS — R73.9 HYPERGLYCEMIA: ICD-10-CM

## 2025-05-15 DIAGNOSIS — I10 ESSENTIAL HYPERTENSION: ICD-10-CM

## 2025-05-15 PROCEDURE — G0463 HOSPITAL OUTPT CLINIC VISIT: HCPCS

## 2025-05-15 NOTE — PROGRESS NOTES
Follow Up [facility] Disorders [facility] Note       Primary Care Physician: Hansa Daniels MD    Interval History:   The patient is a 34 y.o. female      History of Present Illness  The patient presents for evaluation of sleep apnea. She is accompanied by her daughter.    She was last evaluated by Dr. Potter approximately a year ago and has been utilizing a CPAP machine since then. She reports an improvement in her sleep quality, with her bedtime varying between 9:30 PM and midnight. She typically wakes up at 5:30 AM, resulting in a limited sleep duration. She uses a mask that covers both her nose and mouth, which she finds to be a good fit. However, she notes the presence of strap impressions on her face, despite using strap covers. She expresses a desire to order new supplies for her CPAP machine.    Supplemental Information  She takes hydrochlorothiazide for high blood pressure.    SOCIAL HISTORY  She works for [employer].    MEDICATIONS  hydrochlorothiazide  Dr. Simon previous note from 2/15/2024:  SLEEP CLINIC FOLLOW UP PROGRESS NOTE.     Carolina Frias  [id number]   1990  33 y.o.  female        PCP: Hansa Daniels MD        Date of visit: 2/15/2024     No chief complaint on file.        Medications and allergies are reviewed by me and documented in the encounter.      SOCIAL (habits pertaining to sleep medicine)  History tobacco use:Yes e-cigarette user  History of alcohol use: 2-3 per month  Caffeine use: 1-2 beverages/d      HPI:  This is a 33 y.o. PMHx HTN, Anxiety, PTSD, Borderline Personality Disorder. Here for management of Severe Obstructive Sleep Apnea (ALEXANDRA 51.6/hr on 10/12/23 HST that showed sleep related hypoxia; underwent subsequent PAP Titration on 11/9/2023 no hypoxia with titration). Patient is using positive airway pressure therapy and the symptoms of sleep apnea have improved significantly on the therapy. Normally patient goes to bed at 9-11 PM and wakes up at 530-630  "AM .  The patient wakes up 2-3 time(s) during the night and has no problem going back to sleep.  Feels refreshed after waking up.      Overall patient's Impression of their PAP therapy is: Going great  Sleep significantly more restorative    Prefers her full face mask no leak symptoms     Compliance data is reviewed by me with patient room today  Date range 11/21/2023 - 2/13/2024  Overall use  100%  4-hour mahsa 96%  Average days used 7 hours 27 minutes  Device AirSense 11 AutoSet  Auto CPAP 16-20 cm H2O  EPR 2  95th percentile pressure 16.5 cm H2O  Max pressure 17 cm H2O  95th percentile leak is 14.1 LPM  AHI 0.7  DME:[facility]  Mask: FFM    Downloaded PAP Data Evaluated For Therapeutic Response and Compliance:  DME is [employer].  Downloads between 2/13/2025 and 5/13/2025.  Average usage is 7 hours and 19 minutes and 100% of the last 90 days for more than 4 hours.  Average AHI is 0.5.  PAP pressure is 16.2 CWP.    The patient uses a face mask interface.    Review of Systems:    A complete review of systems was done and all were negative with the exception of none    Social History:    Social History     Socioeconomic History    Marital status: Single   Tobacco Use    Smoking status: Some Days     Types: Electronic Cigarette    Smokeless tobacco: Never    Tobacco comments:     cigarettes and e-cigarettes    Vaping Use    Vaping status: Some Days    Substances: Nicotine    Devices: Disposable   Substance and Sexual Activity    Alcohol use: No     Comment: rare    Drug use: Yes     Types: \"Crack\" cocaine, Cocaine(coke), Hydrocodone, LSD, Marijuana, MDMA (ecstacy), Oxycodone     Comment: cbd gummies    Sexual activity: Yes     Partners: Male     Birth control/protection: None, Tubal ligation, Hysterectomy, Partner of same sex       Allergies:  Lamotrigine, Lactose, Amoxicillin, Gabapentin, and Sertraline     Medication Review:  Reviewed.      Vital Signs:    Vitals:    05/15/25 1500   BP: 118/81   Pulse: 88   SpO2: 99% " "  Weight: (!) 140 kg (309 lb)   Height: 157.5 cm (62.01\")     Body mass index is 56.5 kg/m².  .BMIFOLLOWUP    Physical Exam:    Constitutional:  Well developed 34 y.o. female that appears in no apparent distress.  Awake & oriented times 3.  Normal mood with normal recent and remote memory and normal judgement.  Eyes:  Conjunctivae normal.      Self-administered [place] Sleepiness Scale test results: 3  0-5 Lower normal daytime sleepiness  6-10 Higher normal daytime sleepiness  11-12 Mild, 13-15 Moderate, & 16-24 Severe excessive daytime sleepiness       I have reviewed the above results and compared them with the patient's last downloads and reviewed with the patient.    Impression:     Encounter Diagnoses   Name Primary?    Class 3 severe obesity due to excess calories with serious comorbidity and body mass index (BMI) of 50.0 to 59.9 in adult Yes    Essential hypertension     Hyperglycemia     PCOS (polycystic ovarian syndrome)     ZHENG (obstructive sleep apnea)          Assessment & Plan  1. Sleep Apnea.  Her current treatment regimen appears to be effective, with no need for modifications at this time. She is using her CPAP machine for an average of 7 hours and 19 minutes per night, which is within the recommended range. The mask fits well, although she experiences some strap impressions on her face. A new set of CPAP supplies will be ordered.    Follow-up  The patient will follow up in 1 year.         Good sleep hygiene measures should be maintained.  Weight loss would be beneficial in this patient who has obesity class III by Body mass index is 56.5 kg/m².      After evaluating the patient and assessing results available, the patient is benefiting from the treatment being provided.     The patient will continue CPAP.  Potential side effects of PAP therapy reviewed and addressed as needed.  After clinical evaluation and review of downloads, I recommend no changes to the patient's pressures.      I answered all " of the patient's questions.  The patient will call the [facility] Disorder Center for any problems and will follow up care.    Patient or patient representative verbalized consent for the use of Ambient Listening during the visit with  Wali Javed MD for chart documentation. 5/15/2025  15:58 EDT           Wali Javed MD  Sleep Medicine  05/15/25  15:48 EDT

## 2025-05-21 ENCOUNTER — DOCUMENTATION (OUTPATIENT)
Dept: SLEEP MEDICINE | Facility: HOSPITAL | Age: 35
End: 2025-05-21
Payer: COMMERCIAL

## 2025-05-28 ENCOUNTER — PROCEDURE VISIT (OUTPATIENT)
Dept: NEUROLOGY | Facility: CLINIC | Age: 35
End: 2025-05-28
Payer: COMMERCIAL

## 2025-05-28 VITALS
BODY MASS INDEX: 56.5 KG/M2 | OXYGEN SATURATION: 97 % | DIASTOLIC BLOOD PRESSURE: 90 MMHG | SYSTOLIC BLOOD PRESSURE: 168 MMHG | HEART RATE: 85 BPM | HEIGHT: 62 IN

## 2025-05-28 DIAGNOSIS — R20.2 NUMBNESS AND TINGLING IN BOTH HANDS: Primary | ICD-10-CM

## 2025-05-28 DIAGNOSIS — R20.0 NUMBNESS AND TINGLING IN BOTH HANDS: Primary | ICD-10-CM

## 2025-05-28 NOTE — PROGRESS NOTES
EMG and Nerve Conduction Studies      History: 34-year-old woman with numbness and reports of twitching in the upper extremities.      Results: Nerve conduction studies were performed on both upper extremities.  Left median antidromic sensory nerve action potentials across the wrist segment were borderline in expected latency and prolonged in comparison to ipsilateral ulnar and radial findings.  Right median antidromic sensory nerve action potentials across the wrist segment were prolonged in expected latency.  Bilateral radial and ulnar antidromic sensory nerve action potentials across the wrist segment were normal in latency.  Bilateral median compound motor action potentials were normal throughout although somewhat prolonged in latency compared to ipsilateral ulnar findings across the wrist segment.  Bilateral ulnar compound motor action potentials were normal.  Bilateral median and bilateral ulnar F waves were present, symmetric, and reasonable in latency.    EMG needle examination of selected muscles of both upper extremities revealed no abnormal insertional activity, spontaneous activity, or motor unit remodeling.  Please see accompanying data for details.    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  Findings also suggest possible/trace left median neuropathy at the wrist, as can be seen in carpal tunnel syndrome.    Anthony Russ M.D.              Dictated utilizing Dragon dictation.

## 2025-06-03 ENCOUNTER — PATIENT MESSAGE (OUTPATIENT)
Dept: FAMILY MEDICINE CLINIC | Facility: CLINIC | Age: 35
End: 2025-06-03
Payer: COMMERCIAL

## 2025-06-06 ENCOUNTER — OFFICE VISIT (OUTPATIENT)
Dept: FAMILY MEDICINE CLINIC | Facility: CLINIC | Age: 35
End: 2025-06-06
Payer: COMMERCIAL

## 2025-06-06 VITALS
HEIGHT: 62 IN | SYSTOLIC BLOOD PRESSURE: 130 MMHG | DIASTOLIC BLOOD PRESSURE: 90 MMHG | OXYGEN SATURATION: 98 % | BODY MASS INDEX: 53.92 KG/M2 | HEART RATE: 96 BPM | WEIGHT: 293 LBS

## 2025-06-06 DIAGNOSIS — E28.2 PCOS (POLYCYSTIC OVARIAN SYNDROME): ICD-10-CM

## 2025-06-06 DIAGNOSIS — I10 ESSENTIAL HYPERTENSION: ICD-10-CM

## 2025-06-06 DIAGNOSIS — R73.9 HYPERGLYCEMIA: Primary | ICD-10-CM

## 2025-06-06 DIAGNOSIS — R53.83 OTHER FATIGUE: ICD-10-CM

## 2025-06-06 DIAGNOSIS — H61.23 BILATERAL IMPACTED CERUMEN: ICD-10-CM

## 2025-06-06 DIAGNOSIS — R42 DIZZINESS: ICD-10-CM

## 2025-06-06 RX ORDER — METOPROLOL SUCCINATE 25 MG/1
25 TABLET, EXTENDED RELEASE ORAL DAILY
Qty: 90 TABLET | Refills: 1 | Status: SHIPPED | OUTPATIENT
Start: 2025-06-06

## 2025-06-06 RX ORDER — METFORMIN HYDROCHLORIDE 500 MG/1
500 TABLET, EXTENDED RELEASE ORAL
Qty: 90 TABLET | Refills: 1 | Status: SHIPPED | OUTPATIENT
Start: 2025-06-06

## 2025-06-06 NOTE — PATIENT INSTRUCTIONS
I have ordered lab tests today.  You should receive a phone call or a [employer] message with those results.  If you have not heard from us in 7-10 days, please call the office.      I added a low dose of metoprolol for your blood pressure because that being high could be part of the dizzy feeling. Cleaning out your ears may help this.     I did send in metformin at a low dose to get started on while we wait on labs.     We cleaned out your ear wax and this may help with the dizziness.

## 2025-06-06 NOTE — PROGRESS NOTES
"Subjective     Carolina Frias is a 35 y.o. female who presents with   Chief Complaint   Patient presents with    Hypertension       Hypertension     She's been having issues with feeling dizzy and off balance for the past few days.  She's been tired and noticing that she's been feeling weak and shaky. She's had high glucose in the past and expressed interest in trying metformin.  She's just got constant pressure in her head.     She's had hyperglycemia in the past and concerned her blood sugars could be playing a role in how she's feeling.     She is on hctz for blood pressure.     She's started back on ashwaganda to try to help with anxiety .  She uses some THC gummies prn.  She has not done well with antidepressants in the past.    She's also on Sehlby, Calcium, Mag, Zinc and vitamin D. She's tolerated these supplements well in the past.     The sensation in her arms has resolved and she thinks it was a stress issue and when it did happen, it was always stress related.             Review of Systems     Objective     /90 Comment: rt lower arm  Pulse 96   Ht 157.5 cm (62.01\")   Wt (!) 143 kg (315 lb 1.6 oz)   LMP 05/09/2023 (Exact Date)   SpO2 98%   BMI 57.62 kg/m²     Physical Exam  Constitutional:       Appearance: Normal appearance.   HENT:      Ears:      Comments: Bilateral canal stenosis  Lymphadenopathy:      Cervical: No cervical adenopathy.   Neurological:      Mental Status: She is alert.   Psychiatric:         Behavior: Behavior normal.         Thought Content: Thought content normal.         Ear Cerumen Removal    Date/Time: 6/6/2025 11:02 AM    Performed by: Hnasa Daniels MD  Authorized by: Hansa Daniels MD  Location details: right ear and left ear  Procedure type: irrigation   Sedation:  Patient sedated: no             Assessment & Plan   Diagnoses and all orders for this visit:    1. Hyperglycemia (Primary)  -     Hemoglobin A1c  -     metFORMIN ER " (GLUCOPHAGE-XR) 500 MG 24 hr tablet; Take 1 tablet by mouth Daily With Breakfast.  Dispense: 90 tablet; Refill: 1    2. Bilateral impacted cerumen  -     Ear Cerumen Removal    3. Other fatigue  -     Comprehensive Metabolic Panel  -     CBC & Differential  -     TSH  -     Vitamin B12    4. Dizziness  -     Comprehensive Metabolic Panel  -     CBC & Differential  -     TSH    5. Essential hypertension  -     metoprolol succinate XL (Toprol XL) 25 MG 24 hr tablet; Take 1 tablet by mouth Daily.  Dispense: 90 tablet; Refill: 1    6. PCOS (polycystic ovarian syndrome)  -     metFORMIN ER (GLUCOPHAGE-XR) 500 MG 24 hr tablet; Take 1 tablet by mouth Daily With Breakfast.  Dispense: 90 tablet; Refill: 1        Discussion    Patient Instructions   I have ordered lab tests today.  You should receive a phone call or a [employer] message with those results.  If you have not heard from us in 7-10 days, please call the office.      I added a low dose of metoprolol for your blood pressure because that being high could be part of the dizzy feeling. Cleaning out your ears may help this.     I did send in metformin at a low dose to get started on while we wait on labs.     We cleaned out your ear wax and this may help with the dizziness.                  Hansa Daniels MD

## 2025-06-07 LAB
ALBUMIN SERPL-MCNC: 4.4 G/DL (ref 3.9–4.9)
ALP SERPL-CCNC: 84 IU/L (ref 44–121)
ALT SERPL-CCNC: 17 IU/L (ref 0–32)
AST SERPL-CCNC: 15 IU/L (ref 0–40)
BASOPHILS # BLD AUTO: 0 X10E3/UL (ref 0–0.2)
BASOPHILS NFR BLD AUTO: 0 %
BILIRUB SERPL-MCNC: 0.2 MG/DL (ref 0–1.2)
BUN SERPL-MCNC: 8 MG/DL (ref 6–20)
BUN/CREAT SERPL: 12 (ref 9–23)
CALCIUM SERPL-MCNC: 9.3 MG/DL (ref 8.7–10.2)
CHLORIDE SERPL-SCNC: 102 MMOL/L (ref 96–106)
CO2 SERPL-SCNC: 21 MMOL/L (ref 20–29)
CREAT SERPL-MCNC: 0.65 MG/DL (ref 0.57–1)
EGFRCR SERPLBLD CKD-EPI 2021: 118 ML/MIN/1.73
EOSINOPHIL # BLD AUTO: 0.1 X10E3/UL (ref 0–0.4)
EOSINOPHIL NFR BLD AUTO: 1 %
ERYTHROCYTE [DISTWIDTH] IN BLOOD BY AUTOMATED COUNT: 13.2 % (ref 11.7–15.4)
GLOBULIN SER CALC-MCNC: 2.7 G/DL (ref 1.5–4.5)
GLUCOSE SERPL-MCNC: 78 MG/DL (ref 70–99)
HBA1C MFR BLD: 5.9 % (ref 4.8–5.6)
HCT VFR BLD AUTO: 41.4 % (ref 34–46.6)
HGB BLD-MCNC: 13.3 G/DL (ref 11.1–15.9)
IMM GRANULOCYTES # BLD AUTO: 0 X10E3/UL (ref 0–0.1)
IMM GRANULOCYTES NFR BLD AUTO: 0 %
LYMPHOCYTES # BLD AUTO: 2.2 X10E3/UL (ref 0.7–3.1)
LYMPHOCYTES NFR BLD AUTO: 22 %
MCH RBC QN AUTO: 27.5 PG (ref 26.6–33)
MCHC RBC AUTO-ENTMCNC: 32.1 G/DL (ref 31.5–35.7)
MCV RBC AUTO: 86 FL (ref 79–97)
MONOCYTES # BLD AUTO: 0.4 X10E3/UL (ref 0.1–0.9)
MONOCYTES NFR BLD AUTO: 4 %
NEUTROPHILS # BLD AUTO: 7.2 X10E3/UL (ref 1.4–7)
NEUTROPHILS NFR BLD AUTO: 73 %
PLATELET # BLD AUTO: 394 X10E3/UL (ref 150–450)
POTASSIUM SERPL-SCNC: 4.2 MMOL/L (ref 3.5–5.2)
PROT SERPL-MCNC: 7.1 G/DL (ref 6–8.5)
RBC # BLD AUTO: 4.83 X10E6/UL (ref 3.77–5.28)
SODIUM SERPL-SCNC: 140 MMOL/L (ref 134–144)
TSH SERPL DL<=0.005 MIU/L-ACNC: 3.17 UIU/ML (ref 0.45–4.5)
VIT B12 SERPL-MCNC: 515 PG/ML (ref 232–1245)
WBC # BLD AUTO: 10 X10E3/UL (ref 3.4–10.8)

## 2025-07-25 ENCOUNTER — HOSPITAL ENCOUNTER (OUTPATIENT)
Dept: CT IMAGING | Facility: HOSPITAL | Age: 35
Discharge: HOME | End: 2025-07-25
Admitting: FAMILY MEDICINE
Payer: COMMERCIAL

## 2025-07-25 ENCOUNTER — OFFICE VISIT (OUTPATIENT)
Dept: FAMILY MEDICINE CLINIC | Facility: CLINIC | Age: 35
End: 2025-07-25
Payer: COMMERCIAL

## 2025-07-25 VITALS
BODY MASS INDEX: 53.92 KG/M2 | TEMPERATURE: 98.2 F | SYSTOLIC BLOOD PRESSURE: 134 MMHG | HEIGHT: 62 IN | DIASTOLIC BLOOD PRESSURE: 84 MMHG | HEART RATE: 85 BPM | WEIGHT: 293 LBS | OXYGEN SATURATION: 98 %

## 2025-07-25 DIAGNOSIS — R10.9 RIGHT FLANK PAIN: ICD-10-CM

## 2025-07-25 DIAGNOSIS — R10.9 RIGHT FLANK PAIN: Primary | ICD-10-CM

## 2025-07-25 LAB
BILIRUB BLD-MCNC: NEGATIVE MG/DL
CLARITY, POC: CLEAR
COLOR UR: YELLOW
EXPIRATION DATE: NORMAL
GLUCOSE UR STRIP-MCNC: NEGATIVE MG/DL
KETONES UR QL: NEGATIVE
LEUKOCYTE EST, POC: NEGATIVE
Lab: NORMAL
NITRITE UR-MCNC: NEGATIVE MG/ML
PH UR: 6 [PH] (ref 5–8)
PROT UR STRIP-MCNC: NEGATIVE MG/DL
RBC # UR STRIP: NEGATIVE /UL
SP GR UR: 1.02 (ref 1–1.03)
UROBILINOGEN UR QL: NORMAL

## 2025-07-25 PROCEDURE — 81003 URINALYSIS AUTO W/O SCOPE: CPT | Performed by: FAMILY MEDICINE

## 2025-07-25 PROCEDURE — 74176 CT ABD & PELVIS W/O CONTRAST: CPT

## 2025-07-25 PROCEDURE — 99214 OFFICE O/P EST MOD 30 MIN: CPT | Performed by: FAMILY MEDICINE

## 2025-07-25 RX ORDER — METOPROLOL TARTRATE 25 MG/1
25 TABLET, FILM COATED ORAL
COMMUNITY

## 2025-07-25 RX ORDER — ONDANSETRON 4 MG/1
4 TABLET, ORALLY DISINTEGRATING ORAL
COMMUNITY
Start: 2025-06-11

## 2025-07-25 RX ORDER — ONDANSETRON 8 MG/1
8 TABLET, FILM COATED ORAL EVERY 8 HOURS PRN
Qty: 30 TABLET | Refills: 3 | Status: SHIPPED | OUTPATIENT
Start: 2025-07-25

## 2025-07-25 NOTE — PROGRESS NOTES
"Chief Complaint  Back Pain    Subjective        Carolina Frias presents to [facility]       The patient is a 35-year-old female who presents to the clinic with concerns of kidney stones.    She has a history of recurrent kidney stones and is currently experiencing a decrease in urine output, back pain, and extreme fatigue. The pain, which is located on her right side, woke her up last night and has been progressively worsening. She reports no fevers or chills but does report nausea. She has run out of her prescribed Zofran. She has an upcoming appointment with [facility]. Her last CT scan was conducted in June 2025, during which she was diagnosed with diverticulitis. She also reports increased urinary frequency and urgency but no pain during urination. In an attempt to alleviate her symptoms, she has been consuming large amounts of water.    MEDICATIONS  Zofran    Back Pain      Objective   Vital Signs:  /84   Pulse 85   Temp 98.2 °F (36.8 °C)   Ht 157.5 cm (62.01\")   Wt (!) 143 kg (315 lb)   SpO2 98%   BMI 57.60 kg/m²   Estimated body mass index is 57.6 kg/m² as calculated from the following:    Height as of this encounter: 157.5 cm (62.01\").    Weight as of this encounter: 143 kg (315 lb).          Physical Exam  Constitutional:       Appearance: Normal appearance.   HENT:      Head: Normocephalic and atraumatic.      Nose: Nose normal.      Mouth/Throat:      Mouth: Mucous membranes are moist.   Eyes:      General: Lids are normal.      Conjunctiva/sclera: Conjunctivae normal.   Cardiovascular:      Rate and Rhythm: Normal rate and regular rhythm.      Heart sounds: Normal heart sounds.   Pulmonary:      Effort: Pulmonary effort is normal.      Breath sounds: Normal breath sounds.   Abdominal:      General: Abdomen is flat.      Palpations: Abdomen is soft.      Tenderness: There is no abdominal tenderness. There is right CVA tenderness. There is no left CVA " tenderness or guarding.   Musculoskeletal:      Cervical back: Normal range of motion.   Skin:     General: Skin is warm and dry.   Neurological:      General: No focal deficit present.      Mental Status: She is alert and oriented to person, place, and time.      Gait: Gait is intact.   Psychiatric:         Mood and Affect: Mood normal.         Behavior: Behavior normal.         Thought Content: Thought content normal.        Result Review :               Results  Imaging  CT scan in June 2025 did not show any kidney stones.  It did show diverticulitis.  CT scan today shows left-sided nephrolithiasis without hydronephrosis or fat stranding around either kidney.  No evidence of diverticulitis.  Fatty liver persists.     Assessment and Plan        1. Suspected kidney stones.  She reports a history of kidney stones and is currently experiencing decreased urine output prior to pushing fluids (now with frequency/urgency), back pain, and extreme exhaustion. The pain, which is on the right side, woke her up last night and has gradually worsened. She also reports nausea but no fever, chills, or pain during urination. A CT scan will be ordered to confirm the presence of kidney stones. Additionally, a urine sample and culture, metabolic panel, and blood count will be conducted to assess kidney function. She will be provided with Zofran for nausea management.    Diagnoses and all orders for this visit:    1. Right flank pain (Primary)  -     POCT urinalysis dipstick, automated  -     Basic metabolic panel  -     CT Abdomen Pelvis Stone Protocol; Future  -     Urine Culture - Urine, Urine, Clean Catch  -     CBC & Differential             Follow Up   No follow-ups on file.  Patient was given instructions and counseling regarding her condition or for health maintenance advice. Please see specific information pulled into the AVS if appropriate.     Patient or patient representative verbalized consent for the use of Ambient  Listening during the visit with  Kathie Liriano MD for chart documentation. 7/25/2025  15:54 EDT

## 2025-07-26 LAB
BASOPHILS # BLD AUTO: 0.02 10*3/MM3 (ref 0–0.2)
BASOPHILS NFR BLD AUTO: 0.2 % (ref 0–1.5)
BUN SERPL-MCNC: 12 MG/DL (ref 6–20)
BUN/CREAT SERPL: 19 (ref 7–25)
CALCIUM SERPL-MCNC: 9.1 MG/DL (ref 8.6–10.5)
CHLORIDE SERPL-SCNC: 103 MMOL/L (ref 98–107)
CO2 SERPL-SCNC: 23.2 MMOL/L (ref 22–29)
CREAT SERPL-MCNC: 0.63 MG/DL (ref 0.57–1)
EGFRCR SERPLBLD CKD-EPI 2021: 119.6 ML/MIN/1.73
EOSINOPHIL # BLD AUTO: 0.11 10*3/MM3 (ref 0–0.4)
EOSINOPHIL NFR BLD AUTO: 1.1 % (ref 0.3–6.2)
ERYTHROCYTE [DISTWIDTH] IN BLOOD BY AUTOMATED COUNT: 12.7 % (ref 12.3–15.4)
GLUCOSE SERPL-MCNC: 127 MG/DL (ref 65–99)
HCT VFR BLD AUTO: 37.2 % (ref 34–46.6)
HGB BLD-MCNC: 12.3 G/DL (ref 12–15.9)
IMM GRANULOCYTES # BLD AUTO: 0.03 10*3/MM3 (ref 0–0.05)
IMM GRANULOCYTES NFR BLD AUTO: 0.3 % (ref 0–0.5)
LYMPHOCYTES # BLD AUTO: 2.29 10*3/MM3 (ref 0.7–3.1)
LYMPHOCYTES NFR BLD AUTO: 23.9 % (ref 19.6–45.3)
MCH RBC QN AUTO: 27.2 PG (ref 26.6–33)
MCHC RBC AUTO-ENTMCNC: 33.1 G/DL (ref 31.5–35.7)
MCV RBC AUTO: 82.3 FL (ref 79–97)
MONOCYTES # BLD AUTO: 0.35 10*3/MM3 (ref 0.1–0.9)
MONOCYTES NFR BLD AUTO: 3.6 % (ref 5–12)
NEUTROPHILS # BLD AUTO: 6.79 10*3/MM3 (ref 1.7–7)
NEUTROPHILS NFR BLD AUTO: 70.9 % (ref 42.7–76)
NRBC BLD AUTO-RTO: 0 /100 WBC (ref 0–0.2)
PLATELET # BLD AUTO: 369 10*3/MM3 (ref 140–450)
POTASSIUM SERPL-SCNC: 4.2 MMOL/L (ref 3.5–5.2)
RBC # BLD AUTO: 4.52 10*6/MM3 (ref 3.77–5.28)
SODIUM SERPL-SCNC: 138 MMOL/L (ref 136–145)
WBC # BLD AUTO: 9.59 10*3/MM3 (ref 3.4–10.8)

## 2025-07-27 LAB
BACTERIA UR CULT: NORMAL
BACTERIA UR CULT: NORMAL

## 2025-08-12 ENCOUNTER — OFFICE VISIT (OUTPATIENT)
Dept: FAMILY MEDICINE CLINIC | Facility: CLINIC | Age: 35
End: 2025-08-12
Payer: COMMERCIAL

## 2025-08-12 VITALS
HEIGHT: 62 IN | HEART RATE: 76 BPM | OXYGEN SATURATION: 99 % | SYSTOLIC BLOOD PRESSURE: 132 MMHG | BODY MASS INDEX: 53.92 KG/M2 | TEMPERATURE: 98.6 F | WEIGHT: 293 LBS | DIASTOLIC BLOOD PRESSURE: 86 MMHG

## 2025-08-12 DIAGNOSIS — I10 ESSENTIAL HYPERTENSION: ICD-10-CM

## 2025-08-12 DIAGNOSIS — E66.813 CLASS 3 SEVERE OBESITY DUE TO EXCESS CALORIES WITH SERIOUS COMORBIDITY AND BODY MASS INDEX (BMI) OF 50.0 TO 59.9 IN ADULT: ICD-10-CM

## 2025-08-12 DIAGNOSIS — E28.2 PCOS (POLYCYSTIC OVARIAN SYNDROME): ICD-10-CM

## 2025-08-12 DIAGNOSIS — R73.03 PREDIABETES: Primary | ICD-10-CM

## 2025-08-12 DIAGNOSIS — K76.0 FATTY LIVER: ICD-10-CM

## 2025-08-12 PROCEDURE — 99214 OFFICE O/P EST MOD 30 MIN: CPT | Performed by: FAMILY MEDICINE

## 2025-08-19 ENCOUNTER — PATIENT MESSAGE (OUTPATIENT)
Dept: FAMILY MEDICINE CLINIC | Facility: CLINIC | Age: 35
End: 2025-08-19
Payer: COMMERCIAL

## 2025-08-19 DIAGNOSIS — E28.2 PCOS (POLYCYSTIC OVARIAN SYNDROME): ICD-10-CM

## 2025-08-19 DIAGNOSIS — R73.9 HYPERGLYCEMIA: ICD-10-CM

## 2025-08-21 RX ORDER — METFORMIN HYDROCHLORIDE 500 MG/1
1500 TABLET, EXTENDED RELEASE ORAL
Qty: 270 TABLET | Refills: 0 | Status: SHIPPED | OUTPATIENT
Start: 2025-08-21

## 2025-08-29 DIAGNOSIS — I10 ESSENTIAL HYPERTENSION: ICD-10-CM

## 2025-08-31 RX ORDER — HYDROCHLOROTHIAZIDE 12.5 MG/1
12.5 TABLET ORAL DAILY
Qty: 90 TABLET | Refills: 1 | Status: SHIPPED | OUTPATIENT
Start: 2025-08-31